# Patient Record
Sex: FEMALE | Race: WHITE | NOT HISPANIC OR LATINO | Employment: FULL TIME | URBAN - METROPOLITAN AREA
[De-identification: names, ages, dates, MRNs, and addresses within clinical notes are randomized per-mention and may not be internally consistent; named-entity substitution may affect disease eponyms.]

---

## 2023-08-07 ENCOUNTER — TELEPHONE (OUTPATIENT)
Dept: PRIMARY CARE | Facility: CLINIC | Age: 38
End: 2023-08-07
Payer: COMMERCIAL

## 2023-08-07 DIAGNOSIS — M25.511 ACUTE PAIN OF RIGHT SHOULDER: Primary | ICD-10-CM

## 2023-08-07 PROBLEM — K58.9 IBS (IRRITABLE BOWEL SYNDROME): Status: ACTIVE | Noted: 2023-08-07

## 2023-08-07 PROBLEM — R79.89 TSH ELEVATION: Status: ACTIVE | Noted: 2023-08-07

## 2023-08-07 NOTE — TELEPHONE ENCOUNTER
Went to PT at [facility] in [place] for shoulder, hip, knee finished in June.  Left shoulder is acting up again called [facility] to make appt was told she needs a new referral since original was signed off in June

## 2023-08-08 ENCOUNTER — OFFICE VISIT (OUTPATIENT)
Dept: PRIMARY CARE | Facility: CLINIC | Age: 38
End: 2023-08-08
Payer: COMMERCIAL

## 2023-08-08 VITALS
DIASTOLIC BLOOD PRESSURE: 62 MMHG | HEART RATE: 70 BPM | HEIGHT: 65 IN | WEIGHT: 124.6 LBS | SYSTOLIC BLOOD PRESSURE: 100 MMHG | BODY MASS INDEX: 20.76 KG/M2 | TEMPERATURE: 97.5 F | RESPIRATION RATE: 16 BRPM

## 2023-08-08 DIAGNOSIS — G89.29 CHRONIC LEFT SHOULDER PAIN: Primary | ICD-10-CM

## 2023-08-08 DIAGNOSIS — M25.512 CHRONIC LEFT SHOULDER PAIN: Primary | ICD-10-CM

## 2023-08-08 PROBLEM — N94.819 VULVODYNIA: Status: ACTIVE | Noted: 2023-08-08

## 2023-08-08 PROCEDURE — 1036F TOBACCO NON-USER: CPT | Performed by: FAMILY MEDICINE

## 2023-08-08 PROCEDURE — 99212 OFFICE O/P EST SF 10 MIN: CPT | Performed by: FAMILY MEDICINE

## 2023-08-08 RX ORDER — ESTRADIOL 0.1 MG/G
CREAM VAGINAL
COMMUNITY
Start: 2023-06-19

## 2023-08-08 RX ORDER — FLUTICASONE PROPIONATE 50 MCG
1 SPRAY, SUSPENSION (ML) NASAL
COMMUNITY

## 2023-08-08 RX ORDER — BECLOMETHASONE DIPROPIONATE HFA 80 UG/1
2 AEROSOL, METERED RESPIRATORY (INHALATION) 2 TIMES DAILY
COMMUNITY

## 2023-08-08 NOTE — PROGRESS NOTES
"Subjective   Patient ID: Shruti Graham is a 37 y.o. female who presents for Shoulder Pain (Left shoulder pain for a couple months. Patient states the pain is a 3-4 during workouts. ).    Hurts anytime lifts shoulder, injured it in the past,   went to PT in the past  Doing heavy lifting   Injured 10 years ago  right handed  Hurts to raise up left arm  does lots weight lifting          Review of Systems    Objective   /62   Pulse 70   Temp 36.4 °C (97.5 °F)   Resp 16   Ht 1.651 m (5' 5\")   Wt 56.5 kg (124 lb 9.6 oz)   BMI 20.73 kg/m²     Physical Exam  Musculoskeletal:      Left shoulder: Tenderness present. Decreased range of motion.         Assessment/Plan   Problem List Items Addressed This Visit    None  Visit Diagnoses       Chronic left shoulder pain    -  Primary    Relevant Orders    Referral to Physical Therapy               "

## 2023-08-15 LAB
ALANINE AMINOTRANSFERASE (SGPT) (U/L) IN SER/PLAS: 21 U/L (ref 7–45)
ALBUMIN (G/DL) IN SER/PLAS: 4.4 G/DL (ref 3.4–5)
ALKALINE PHOSPHATASE (U/L) IN SER/PLAS: 67 U/L (ref 33–110)
ANION GAP IN SER/PLAS: 10 MMOL/L (ref 10–20)
APPEARANCE, URINE: CLEAR
ASPARTATE AMINOTRANSFERASE (SGOT) (U/L) IN SER/PLAS: 18 U/L (ref 9–39)
BILIRUBIN TOTAL (MG/DL) IN SER/PLAS: 0.6 MG/DL (ref 0–1.2)
BILIRUBIN, URINE: NEGATIVE
BLOOD, URINE: NEGATIVE
CALCIUM (MG/DL) IN SER/PLAS: 9.3 MG/DL (ref 8.6–10.6)
CARBON DIOXIDE, TOTAL (MMOL/L) IN SER/PLAS: 26 MMOL/L (ref 21–32)
CHLORIDE (MMOL/L) IN SER/PLAS: 106 MMOL/L (ref 98–107)
CHOLESTEROL (MG/DL) IN SER/PLAS: 224 MG/DL (ref 0–199)
CHOLESTEROL IN HDL (MG/DL) IN SER/PLAS: 79.8 MG/DL
CHOLESTEROL/HDL RATIO: 2.8
COLOR, URINE: YELLOW
CREATININE (MG/DL) IN SER/PLAS: 1.15 MG/DL (ref 0.5–1.05)
ERYTHROCYTE DISTRIBUTION WIDTH (RATIO) BY AUTOMATED COUNT: 13.5 % (ref 11.5–14.5)
ERYTHROCYTE MEAN CORPUSCULAR HEMOGLOBIN CONCENTRATION (G/DL) BY AUTOMATED: 31.9 G/DL (ref 32–36)
ERYTHROCYTE MEAN CORPUSCULAR VOLUME (FL) BY AUTOMATED COUNT: 91 FL (ref 80–100)
ERYTHROCYTES (10*6/UL) IN BLOOD BY AUTOMATED COUNT: 4.67 X10E12/L (ref 4–5.2)
GFR FEMALE: 63 ML/MIN/1.73M2
GLUCOSE (MG/DL) IN SER/PLAS: 86 MG/DL (ref 74–99)
GLUCOSE, URINE: NEGATIVE MG/DL
HEMATOCRIT (%) IN BLOOD BY AUTOMATED COUNT: 42.3 % (ref 36–46)
HEMOGLOBIN (G/DL) IN BLOOD: 13.5 G/DL (ref 12–16)
KETONES, URINE: NEGATIVE MG/DL
LDL: 132 MG/DL (ref 0–99)
LEUKOCYTE ESTERASE, URINE: NEGATIVE
LEUKOCYTES (10*3/UL) IN BLOOD BY AUTOMATED COUNT: 6.5 X10E9/L (ref 4.4–11.3)
NITRITE, URINE: NEGATIVE
NRBC (PER 100 WBCS) BY AUTOMATED COUNT: 0 /100 WBC (ref 0–0)
PH, URINE: 5 (ref 5–8)
PLATELETS (10*3/UL) IN BLOOD AUTOMATED COUNT: 332 X10E9/L (ref 150–450)
POTASSIUM (MMOL/L) IN SER/PLAS: 4.4 MMOL/L (ref 3.5–5.3)
PROTEIN TOTAL: 6.7 G/DL (ref 6.4–8.2)
PROTEIN, URINE: NEGATIVE MG/DL
SODIUM (MMOL/L) IN SER/PLAS: 138 MMOL/L (ref 136–145)
SPECIFIC GRAVITY, URINE: 1.01 (ref 1–1.03)
TRIGLYCERIDE (MG/DL) IN SER/PLAS: 62 MG/DL (ref 0–149)
UREA NITROGEN (MG/DL) IN SER/PLAS: 17 MG/DL (ref 6–23)
UROBILINOGEN, URINE: <2 MG/DL (ref 0–1.9)
VLDL: 12 MG/DL (ref 0–40)

## 2023-09-01 LAB
ALANINE AMINOTRANSFERASE (SGPT) (U/L) IN SER/PLAS: NORMAL
ALBUMIN (G/DL) IN SER/PLAS: NORMAL
ALKALINE PHOSPHATASE (U/L) IN SER/PLAS: NORMAL
ANION GAP IN SER/PLAS: NORMAL
APPEARANCE, URINE: NORMAL
ASCORBIC ACID: NORMAL MG/DL
ASPARTATE AMINOTRANSFERASE (SGOT) (U/L) IN SER/PLAS: NORMAL
BILIRUBIN TOTAL (MG/DL) IN SER/PLAS: NORMAL
BILIRUBIN, URINE: NORMAL
BLOOD, URINE: NORMAL
CALCIUM (MG/DL) IN SER/PLAS: NORMAL
CARBON DIOXIDE, TOTAL (MMOL/L) IN SER/PLAS: NORMAL
CHLORIDE (MMOL/L) IN SER/PLAS: NORMAL
CHOLESTEROL (MG/DL) IN SER/PLAS: NORMAL
CHOLESTEROL IN HDL (MG/DL) IN SER/PLAS: NORMAL
CHOLESTEROL/HDL RATIO: NORMAL
COLOR, URINE: NORMAL
CREATININE (MG/DL) IN SER/PLAS: NORMAL
ERYTHROCYTE DISTRIBUTION WIDTH (RATIO) BY AUTOMATED COUNT: NORMAL
ERYTHROCYTE MEAN CORPUSCULAR HEMOGLOBIN CONCENTRATION (G/DL) BY AUTOMATED: NORMAL
ERYTHROCYTE MEAN CORPUSCULAR VOLUME (FL) BY AUTOMATED COUNT: NORMAL
ERYTHROCYTES (10*6/UL) IN BLOOD BY AUTOMATED COUNT: NORMAL
GFR FEMALE: NORMAL
GFR MALE: NORMAL
GLUCOSE (MG/DL) IN SER/PLAS: NORMAL
GLUCOSE, URINE: NORMAL
HEMATOCRIT (%) IN BLOOD BY AUTOMATED COUNT: NORMAL
HEMOGLOBIN (G/DL) IN BLOOD: NORMAL
KETONES, URINE: NORMAL
LDL: NORMAL
LEUKOCYTE ESTERASE, URINE: NORMAL
LEUKOCYTES (10*3/UL) IN BLOOD BY AUTOMATED COUNT: NORMAL
NITRITE, URINE: NORMAL
NON HDL CHOLESTEROL: NORMAL
NRBC (PER 100 WBCS) BY AUTOMATED COUNT: NORMAL
PH, URINE: NORMAL
PLATELETS (10*3/UL) IN BLOOD AUTOMATED COUNT: NORMAL
POTASSIUM (MMOL/L) IN SER/PLAS: NORMAL
PROTEIN TOTAL: NORMAL
PROTEIN, URINE: NORMAL
SODIUM (MMOL/L) IN SER/PLAS: NORMAL
SPECIFIC GRAVITY, URINE: NORMAL
TRIGLYCERIDE (MG/DL) IN SER/PLAS: NORMAL
UREA NITROGEN (MG/DL) IN SER/PLAS: NORMAL
UROBILINOGEN, URINE: NORMAL
VLDL: NORMAL

## 2023-10-06 ENCOUNTER — APPOINTMENT (OUTPATIENT)
Dept: PHYSICAL THERAPY | Facility: CLINIC | Age: 38
End: 2023-10-06
Payer: COMMERCIAL

## 2023-10-10 PROBLEM — M25.511 RIGHT SHOULDER PAIN: Status: RESOLVED | Noted: 2023-08-07 | Resolved: 2023-10-10

## 2023-10-10 PROBLEM — J45.909 ASTHMA (HHS-HCC): Status: ACTIVE | Noted: 2023-10-10

## 2023-10-10 PROBLEM — M76.51 PATELLAR TENDINITIS OF RIGHT KNEE: Status: ACTIVE | Noted: 2023-10-10

## 2023-10-10 PROBLEM — M19.90 OSTEOARTHRITIS: Status: ACTIVE | Noted: 2023-10-10

## 2023-10-10 PROBLEM — N39.3 STRESS INCONTINENCE, FEMALE: Status: ACTIVE | Noted: 2023-10-10

## 2023-10-10 PROBLEM — S76.312A STRAIN OF LEFT HAMSTRING: Status: ACTIVE | Noted: 2023-10-10

## 2023-10-10 RX ORDER — CLASCOTERONE 1 G/100G
CREAM TOPICAL
COMMUNITY
Start: 2023-09-18 | End: 2024-04-23 | Stop reason: ALTCHOICE

## 2023-10-10 RX ORDER — CLOTRIMAZOLE AND BETAMETHASONE DIPROPIONATE 10; .64 MG/G; MG/G
CREAM TOPICAL
COMMUNITY
End: 2024-04-23 | Stop reason: ALTCHOICE

## 2023-10-10 RX ORDER — FLUCONAZOLE 150 MG/1
TABLET ORAL
COMMUNITY

## 2023-10-10 RX ORDER — 1.1% SODIUM FLUORIDE PRESCRIPTION DENTAL CREAM 5 MG/G
CREAM DENTAL
COMMUNITY
Start: 2023-06-24

## 2023-10-11 ENCOUNTER — TREATMENT (OUTPATIENT)
Dept: PHYSICAL THERAPY | Facility: CLINIC | Age: 38
End: 2023-10-11
Payer: COMMERCIAL

## 2023-10-11 DIAGNOSIS — M25.511 ACUTE PAIN OF RIGHT SHOULDER: ICD-10-CM

## 2023-10-11 DIAGNOSIS — G89.29 CHRONIC LEFT SHOULDER PAIN: Primary | ICD-10-CM

## 2023-10-11 DIAGNOSIS — M25.512 CHRONIC LEFT SHOULDER PAIN: Primary | ICD-10-CM

## 2023-10-11 PROCEDURE — 97110 THERAPEUTIC EXERCISES: CPT | Mod: GP | Performed by: SPECIALIST/TECHNOLOGIST

## 2023-10-11 PROCEDURE — 97140 MANUAL THERAPY 1/> REGIONS: CPT | Mod: GP | Performed by: SPECIALIST/TECHNOLOGIST

## 2023-10-11 ASSESSMENT — PAIN SCALES - GENERAL: PAINLEVEL_OUTOF10: 5 - MODERATE PAIN

## 2023-10-11 ASSESSMENT — PAIN - FUNCTIONAL ASSESSMENT: PAIN_FUNCTIONAL_ASSESSMENT: 0-10

## 2023-10-11 NOTE — PROGRESS NOTES
"Physical Therapy Treatment    Patient Name: Shruti Graham  MRN: [id number]  Today's Date: 10/11/2023       Current Problem  1. Chronic left shoulder pain            Insurance   Payer: [facility]  Visit Number: 23  Approved Visits: Unlimited  Date Range: PCY    Precautions         Subjective   Patient reports feeling good upon arrival.  States left shoulder has been doing very well with the amount of volume that she has been doing.  States right clavicle is recently flared up.  P! W/ inhalation. States compliance with HEP    Pain  Pain Assessment: 0-10  Pain Score: 5 - Moderate pain  Pain Type: Acute pain  Pain Location:  (clavicle)      Objective   P! W/ inhalation  No signifcant TTP except coracoid  Pec Minor shortness   No 1st rib decreased mobility   L shld seems to be continuing to improve    Treatments:  There Ex:   UBE 3 min fwd/bwd     C Spine Retraction + Lift 3\"x15   Banded C Spine Retraction 10x\"    Pec Minor Stretch 2x60\"  Prone Y 3x30\"         Manual:   STM L Posterior Shld, L scalene, rhomboids  R subclavius/pec minor/scale STM  R 1st Rib Mobs  global scap mobs.      Neuro Re-Ed:       Modalities:       Assessment:   Session focused on restoration of dynamic stability and decreasing tissue tone noted in the right upper extremity and neck musculature.  Patient responded well.  At this time the right shoulder pain that is located inferior to the clavicle seems to be more like an intercostal muscle noted due to increase in pain with inhalation as well as not being able to truly reproduce with muscle testing.  Did demonstrate decrease in length and pectoralis minor but at this time it does not seem to be a concordant sign.  Education was provided about this and proper Valsalva maneuver when completing compound list.  Education was provided that at decreased intensity lifts patient should not have to utilize Valsalva maneuver at such a high intensity.  Patient continues to need skilled physical therapy in order " restore functional range of motion strength and capacity post right and left shoulder pain    Plan:   Monitor R shld, continue w/ tendon loading of LUE cuff musculature    Goals:         Nabil Parker, PT

## 2023-10-18 ENCOUNTER — TREATMENT (OUTPATIENT)
Dept: PHYSICAL THERAPY | Facility: CLINIC | Age: 38
End: 2023-10-18
Payer: COMMERCIAL

## 2023-10-18 DIAGNOSIS — M25.512 CHRONIC LEFT SHOULDER PAIN: Primary | ICD-10-CM

## 2023-10-18 DIAGNOSIS — G89.29 CHRONIC LEFT SHOULDER PAIN: Primary | ICD-10-CM

## 2023-10-18 PROCEDURE — 97140 MANUAL THERAPY 1/> REGIONS: CPT | Mod: GP | Performed by: SPECIALIST/TECHNOLOGIST

## 2023-10-18 PROCEDURE — 97110 THERAPEUTIC EXERCISES: CPT | Mod: GP | Performed by: SPECIALIST/TECHNOLOGIST

## 2023-10-18 ASSESSMENT — PAIN SCALES - GENERAL: PAINLEVEL_OUTOF10: 1

## 2023-10-18 ASSESSMENT — PAIN - FUNCTIONAL ASSESSMENT: PAIN_FUNCTIONAL_ASSESSMENT: 0-10

## 2023-10-18 NOTE — PROGRESS NOTES
"Physical Therapy Treatment    Patient Name: Shruti Graham  MRN: [id number]  Today's Date: 10/18/2023  Time Calculation  Start Time: 0230  Stop Time: 0330  Time Calculation (min): 60 min    Current Problem  1. Chronic left shoulder pain              Insurance   Payer: [facility]  Visit Number: 24  Approved Visits: Unlimited  Date Range: PCY    Precautions         Subjective   Patient reports feeling good upon arrival.  Left shoulder tolerated the volume without any significant increase in pain.  Was achy but nothing that she was worried about.  Right clavicle continues to feel the same but only is when she does a Valsalva maneuver.  Reports she did recently flareup her hamstring again    Pain  Pain Assessment: 0-10  Pain Score: 1 shoulder  L HS 5/10       Objective   Decreased tone noted on medial border of scapula and scapular stabilizers.     Treatments:  There Ex:   UBE 3 min fwd/bwd     C Spine Retraction + Lift 3\"x15    Pec Minor Stretch 2x60\"    Seated 90/90 ER Ecc 3x8 6#  Side Plank + shld ER 3x12 4#    90/90 HS ISO 3x30\"  MHFAKE 2x20 ea      Manual:   STM L Posterior Shld, L scalene, rhomboids  global scap mobs.    TrP Prox HS      Neuro Re-Ed:       Modalities:       Assessment:   Session focused on restoration of dynamic stability with progression to eccentric's as well as reassessment of left proximal hamstring.  Patient presents with potential left hamstring proximal tendinopathy and responded well to gentle range of motion with focus on max hip flexion as well as did tolerate increase to eccentric's for left shoulder.  Education was provided about continuing eccentric's as long as they do not significantly increased irritability.  Patient verbalizes understanding and agreement.  Verbalizes understanding with tendon loading protocol.  Patient would like to try integrative dry needling at proximal hamstring neck session.  Education was provided about wearing shorts. Patient continues to need skilled physical " therapy in order restore functional range of motion strength and capacity post right and left shoulder pain    Plan:   Monitor R shld, continue w/ tendon loading of LUE cuff musculature    Goals:         Nabil Parker, PT

## 2023-10-23 ENCOUNTER — TREATMENT (OUTPATIENT)
Dept: PHYSICAL THERAPY | Facility: CLINIC | Age: 38
End: 2023-10-23
Payer: COMMERCIAL

## 2023-10-23 DIAGNOSIS — M25.512 CHRONIC LEFT SHOULDER PAIN: ICD-10-CM

## 2023-10-23 DIAGNOSIS — G89.29 CHRONIC LEFT SHOULDER PAIN: ICD-10-CM

## 2023-10-23 DIAGNOSIS — S76.312S STRAIN OF LEFT HAMSTRING, SEQUELA: Primary | ICD-10-CM

## 2023-10-23 PROCEDURE — 97140 MANUAL THERAPY 1/> REGIONS: CPT | Mod: GP | Performed by: SPECIALIST/TECHNOLOGIST

## 2023-10-23 PROCEDURE — 97110 THERAPEUTIC EXERCISES: CPT | Mod: GP | Performed by: SPECIALIST/TECHNOLOGIST

## 2023-10-23 ASSESSMENT — PAIN - FUNCTIONAL ASSESSMENT: PAIN_FUNCTIONAL_ASSESSMENT: 0-10

## 2023-10-23 ASSESSMENT — PAIN SCALES - GENERAL: PAINLEVEL_OUTOF10: 2

## 2023-10-23 NOTE — PROGRESS NOTES
"Physical Therapy Treatment    Patient Name: Shruti Graham  MRN: [id number]  Today's Date: 10/23/2023  Time Calculation  Start Time: 0227  Stop Time: 0327  Time Calculation (min): 60 min    Current Problem  1. Strain of left hamstring, sequela        2. Chronic left shoulder pain                Insurance   Payer: [facility]  Visit Number: 25  Approved Visits: Unlimited  Date Range: PCY    Precautions         Subjective   Patient reports feeling good upon arrival.  Had some soreness in the shoulder after last session which is to be expected with eccentric exercise.  States that completing hamstring exercises has been very beneficial specifically prior to lifting.  States compliance with HEP.  States hike over the weekend went well with no significant limitations.      Pain  Pain Assessment: 0-10  Pain Score: 2  Pain Location:  (Shoulder)  L HS 5/10       Objective   Decreased tone noted on medial border of scapula and scapular stabilizers.   No significant irritation noted with integrative dry needling.  Normalize response.  No significant increase in pain from needles.  Multiple twitch responses noted      Treatments:  There Ex:   Bike x6 min    Side Plank + shld ER 2x12 4# 5-0-0  Razor Curl ISO 2x3x10\"    90/90 HS ISO 5x45\"  MHFAKE 2x20 ea      Manual:   STM L Posterior Shld, rhomboids    TrP Prox HS   IDN L HS      Neuro Re-Ed:       Modalities:       Assessment:   Session focused on restoration of dynamic stability with progression to eccentric's as well as continued attempt to decrease irritability noted at proximal hamstring.  Patient responded well to integrative dry needling with no significant irritation or or skin reactions.  Education was provided about reactions to dry needling including potential soreness and potential bruising and verbalizes understanding and agreement.  Tolerated addition of higher intensity Nordic's with max hip flexion to target the proximal hamstring.  Demonstrated weakness with this but " adequately challenged.  Education was provided about heavy slow resistance specifically for tendinopathy of left rotator cuff and did well with this.  No significant increase in irritation noted at left cuff. Patient continues to need skilled physical therapy in order restore functional range of motion strength and capacity post left shoulder and hamstring pain  Plan:   Monitor R shld, continue w/ tendon loading of LUE cuff musculature    Goals:         Nabil Parker, PT

## 2023-10-31 ENCOUNTER — TREATMENT (OUTPATIENT)
Dept: PHYSICAL THERAPY | Facility: CLINIC | Age: 38
End: 2023-10-31
Payer: COMMERCIAL

## 2023-10-31 DIAGNOSIS — G89.29 CHRONIC LEFT SHOULDER PAIN: ICD-10-CM

## 2023-10-31 DIAGNOSIS — S76.312S STRAIN OF LEFT HAMSTRING, SEQUELA: Primary | ICD-10-CM

## 2023-10-31 DIAGNOSIS — M25.512 CHRONIC LEFT SHOULDER PAIN: ICD-10-CM

## 2023-10-31 PROCEDURE — 97110 THERAPEUTIC EXERCISES: CPT | Mod: GP | Performed by: SPECIALIST/TECHNOLOGIST

## 2023-10-31 PROCEDURE — 97140 MANUAL THERAPY 1/> REGIONS: CPT | Mod: GP | Performed by: SPECIALIST/TECHNOLOGIST

## 2023-10-31 ASSESSMENT — PAIN - FUNCTIONAL ASSESSMENT: PAIN_FUNCTIONAL_ASSESSMENT: 0-10

## 2023-10-31 ASSESSMENT — PAIN SCALES - GENERAL: PAINLEVEL_OUTOF10: 3

## 2023-10-31 NOTE — PROGRESS NOTES
"Physical Therapy Treatment    Patient Name: Shruti Graham  MRN: [id number]  Today's Date: 10/31/2023  Time Calculation  Start Time: 0827  Stop Time: 0915  Time Calculation (min): 48 min    Current Problem  1. Strain of left hamstring, sequela        2. Chronic left shoulder pain                  Insurance   Payer: [facility]  Visit Number: 26  Approved Visits: Unlimited  Date Range: PCY    Precautions         Subjective   Patient reports feeling good upon arrival.  States compliance with HEP.  Pain is significantly decreased after needling and had several days of complete pain-free.  Would like to needle again today.  Had a personal record deadlift yesterday and demonstrated no significant increase in pain and global muscle soreness today    Pain  Pain Assessment: 0-10  Pain Score: 3      Objective   No negative responses to IDN      Treatments:  There Ex:   Elliptical x8 min    90/90 HS ISO 5x45\"  MHFAKE 2x20 ea    RFD SB HS Curl 2x8   90/90 Ecc HS Curl Supine w/ Ankle Strap 2x8     Manual:     TrP Prox HS   IDN L HS      Neuro Re-Ed:       Modalities:       Assessment:   Session focused on restoration of dynamic stability with progression to eccentric's as well as continued attempt to decrease irritability noted at proximal hamstring.  Patient continues to respond well.  Tolerated addition of eccentric's for proximal hamstring tendinopathy without significant increase in pain.  Education was provided about adding these in outside of therapy and patient verbalizes understanding and agreement.  Decreased intensity was utilized within the session due to the increase intensity outside of session with training regimen at this time. Patient continues to need skilled physical therapy in order restore functional range of motion strength and capacity post left shoulder and hamstring pain  Plan:   Monitor R shld, continue w/ tendon loading of LUE cuff musculature    Goals:         Nabil Parker, PT   "

## 2023-11-06 ENCOUNTER — TREATMENT (OUTPATIENT)
Dept: PHYSICAL THERAPY | Facility: CLINIC | Age: 38
End: 2023-11-06
Payer: COMMERCIAL

## 2023-11-06 DIAGNOSIS — G89.29 CHRONIC LEFT SHOULDER PAIN: Primary | ICD-10-CM

## 2023-11-06 DIAGNOSIS — S76.312S STRAIN OF LEFT HAMSTRING, SEQUELA: ICD-10-CM

## 2023-11-06 DIAGNOSIS — M25.512 CHRONIC LEFT SHOULDER PAIN: Primary | ICD-10-CM

## 2023-11-06 PROCEDURE — 97110 THERAPEUTIC EXERCISES: CPT | Mod: GP | Performed by: SPECIALIST/TECHNOLOGIST

## 2023-11-06 PROCEDURE — 97140 MANUAL THERAPY 1/> REGIONS: CPT | Mod: GP | Performed by: SPECIALIST/TECHNOLOGIST

## 2023-11-06 ASSESSMENT — PAIN SCALES - GENERAL: PAINLEVEL_OUTOF10: 1

## 2023-11-06 ASSESSMENT — PAIN - FUNCTIONAL ASSESSMENT: PAIN_FUNCTIONAL_ASSESSMENT: 0-10

## 2023-11-06 NOTE — PROGRESS NOTES
"Physical Therapy Treatment    Patient Name: Shruti Graham  MRN: [id number]  Today's Date: 11/6/2023  Time Calculation  Start Time: 0230  Stop Time: 0325  Time Calculation (min): 55 min    Current Problem  1. Chronic left shoulder pain        2. Strain of left hamstring, sequela                    Insurance   Payer: [facility]  Visit Number: 27  Approved Visits: Unlimited  Date Range: PCY    Precautions         Subjective   Patient reports feeling good upon arrival.  No significant pain.  States that she feels like the needling continues to help.  States compliance with HEP.  States that she has not had significant pain after dead lifting since starting needling.    Pain  Pain Assessment: 0-10  Pain Score: 1      Objective   No negative responses to IDN      Treatments:  There Ex:   Elliptical x8 min    90/90 HS ISO 5x45\"  MHFAKE 2x20 ea    DL --> SL Prone HS Curl 2x8 30#  90/90 Ecc HS Curl Supine w/ Ankle Strap 3x6 45#    Manual:     TrP Prox HS   IDN L HS      Neuro Re-Ed:       Modalities:       Assessment:   Session focused on restoration of dynamic stability with progression to eccentric's as well as continued attempt to decrease irritability noted at proximal hamstring.  Patient continues to respond well.  Continues to tolerate addition of eccentric's at higher resistance compared to previous sessions.  Education is provided that patient needs to continue to program eccentric's within the program in order to progress tendon loading protocol.  Patient did ask questions about shockwave therapy but will not be initiated until after patient's weightlifting meet in a week and a half.  Decreased intensity was utilized within the session due to the increase intensity outside of session with training regimen at this time. Patient continues to need skilled physical therapy in order restore functional range of motion strength and capacity post left shoulder and hamstring pain  Plan:   Monitor R shld, continue w/ tendon loading " of LUE cuff musculature    Goals:         Nabil Parker, PT

## 2023-11-13 ENCOUNTER — APPOINTMENT (OUTPATIENT)
Dept: PHYSICAL THERAPY | Facility: CLINIC | Age: 38
End: 2023-11-13
Payer: COMMERCIAL

## 2023-11-15 ENCOUNTER — TREATMENT (OUTPATIENT)
Dept: PHYSICAL THERAPY | Facility: CLINIC | Age: 38
End: 2023-11-15
Payer: COMMERCIAL

## 2023-11-15 DIAGNOSIS — S76.312S STRAIN OF LEFT HAMSTRING, SEQUELA: Primary | ICD-10-CM

## 2023-11-15 PROCEDURE — 97110 THERAPEUTIC EXERCISES: CPT | Mod: GP | Performed by: SPECIALIST/TECHNOLOGIST

## 2023-11-15 PROCEDURE — 97016 VASOPNEUMATIC DEVICE THERAPY: CPT | Mod: GP | Performed by: SPECIALIST/TECHNOLOGIST

## 2023-11-15 PROCEDURE — 97140 MANUAL THERAPY 1/> REGIONS: CPT | Mod: GP | Performed by: SPECIALIST/TECHNOLOGIST

## 2023-11-15 NOTE — PROGRESS NOTES
"Physical Therapy Treatment    Patient Name: Shruti Graham  MRN: [id number]  Today's Date: 11/15/2023  Time Calculation  Start Time: 0230  Stop Time: 0330  Time Calculation (min): 60 min    Current Problem  1. Strain of left hamstring, sequela                      Insurance   Payer: [facility]  Visit Number: 28  Approved Visits: Unlimited  Date Range: PCY    Precautions         Subjective   Patient reports feeling good upon arrival.  No significant pain.  States some soreness in prox HS but no significant p!. Deadlifting has been pain free.    Pain         Objective   No negative responses to IDN  Normalized ROM w/ MHFAKE    Treatments:  There Ex:   Elliptical x8 min    90/90 HS ISO 3x45\"  MHFAKE 3x20 ea      Manual:     TrP Prox HS   IDN L HS      Neuro Re-Ed:       Modalities:   GameReady L Hip 15 min med pressure    Assessment:   Session focused on restoration of tissue tolerance and tissue tone with use of dry needling and trigger point release.  Patient responded well.  No significant increase in intensity was had due to patient competing this upcoming weekend.  Utilization of Game ready to decrease amount of soreness that patient will have post needling.  Patient responded well to all interventions.  Heavy slow resistance will be continued after patient's upcoming power lifting meet. Patient continues to need skilled physical therapy in order restore functional range of motion strength and capacity post left shoulder and hamstring pain      Plan:   Progress to HSR w/ LLE for prox HS tendon    Goals:         Nabil Parker, PT   "

## 2023-11-20 ENCOUNTER — TREATMENT (OUTPATIENT)
Dept: PHYSICAL THERAPY | Facility: CLINIC | Age: 38
End: 2023-11-20
Payer: COMMERCIAL

## 2023-11-20 DIAGNOSIS — S76.312S STRAIN OF LEFT HAMSTRING, SEQUELA: ICD-10-CM

## 2023-11-20 DIAGNOSIS — M25.512 CHRONIC LEFT SHOULDER PAIN: Primary | ICD-10-CM

## 2023-11-20 DIAGNOSIS — G89.29 CHRONIC LEFT SHOULDER PAIN: Primary | ICD-10-CM

## 2023-11-20 PROCEDURE — 97140 MANUAL THERAPY 1/> REGIONS: CPT | Mod: GP | Performed by: SPECIALIST/TECHNOLOGIST

## 2023-11-20 PROCEDURE — 97110 THERAPEUTIC EXERCISES: CPT | Mod: GP | Performed by: SPECIALIST/TECHNOLOGIST

## 2023-11-20 PROCEDURE — 97016 VASOPNEUMATIC DEVICE THERAPY: CPT | Mod: GP | Performed by: SPECIALIST/TECHNOLOGIST

## 2023-11-20 NOTE — PROGRESS NOTES
"Physical Therapy Treatment    Patient Name: Shruti Graham  MRN: [id number]  Today's Date: 11/20/2023  Time Calculation  Start Time: 0130  Stop Time: 0245  Time Calculation (min): 75 min    Current Problem  1. Chronic left shoulder pain        2. Strain of left hamstring, sequela                        Insurance   Payer: [facility]  Visit Number: 29  Approved Visits: Unlimited  Date Range: PCY    Precautions         Subjective   Patient reports feeling good upon arrival. Went 9/9 in weightlifting meet with no pain during. Some achiness in prox HS as well as L shld posteriorly.     Pain   1/10      Objective   No negative responses to IDN  Normalized ROM w/ MHFAKE  Increased TTP near prox HS    Treatments:  There Ex:   Elliptical x8 min    90/90 HS ISO 3x45\"  MHFAKE 3x20 ea    90/90 Ecc HS w/ Cable x10    HS Walkout x10    Ashvin Curl 4x6  Prone ER/IR ISO in 90/90 4x5x10\"      Manual:     TrP Prox HS   IDN L HS   TrP L shld     Neuro Re-Ed:       Modalities:   GameReady L Hip 15 min med pressure    Assessment:   Session focused on restoration of tissue tolerance and tissue tone with use of dry needling and trigger point release as well as progression to more isotonic loading. No increase in p! Throughout duration of session. Adequately challenged by increase in intensity of exercises. Overall doing well and is ready to progress to heavy slow resistance after deload. Patient continues to need skilled physical therapy in order restore functional range of motion strength and capacity post left shoulder and hamstring pain      Plan:   Progress to HSR w/ LLE for prox HS tendon    Goals:         Nabil Parker, PT   "

## 2023-12-01 ENCOUNTER — TREATMENT (OUTPATIENT)
Dept: PHYSICAL THERAPY | Facility: CLINIC | Age: 38
End: 2023-12-01
Payer: COMMERCIAL

## 2023-12-01 DIAGNOSIS — S76.312S STRAIN OF LEFT HAMSTRING, SEQUELA: ICD-10-CM

## 2023-12-01 DIAGNOSIS — M25.512 CHRONIC LEFT SHOULDER PAIN: Primary | ICD-10-CM

## 2023-12-01 DIAGNOSIS — G89.29 CHRONIC LEFT SHOULDER PAIN: Primary | ICD-10-CM

## 2023-12-01 PROCEDURE — 97140 MANUAL THERAPY 1/> REGIONS: CPT | Mod: GP | Performed by: SPECIALIST/TECHNOLOGIST

## 2023-12-01 PROCEDURE — 97110 THERAPEUTIC EXERCISES: CPT | Mod: GP | Performed by: SPECIALIST/TECHNOLOGIST

## 2023-12-01 NOTE — PROGRESS NOTES
"Physical Therapy Treatment    Patient Name: Shruti Graham  MRN: [id number]  Today's Date: 12/1/2023  Time Calculation  Start Time: 1030  Stop Time: 1130  Time Calculation (min): 60 min    Current Problem  1. Chronic left shoulder pain        2. Strain of left hamstring, sequela                          Insurance   Payer: [facility]  Visit Number: 30  Approved Visits: Unlimited  Date Range: PCY    Precautions         Subjective   Patient reports feeling okay upon arrival. L Shld is angry from shoveling snow. Pt does report HS is good but achy. Felt during conventional DL and rows.     Pain   1/10      Objective   Increased tone in medial aspect of prox HS. No significant weakness w/ resistance testing of shld ER. P! At end range shld er in 90/90 posterior aspect        Treatments:  There Ex:   Elliptical x8 min    90/90 HS ISO x45\"  MHFAKE x20 ea    Conventional DL Setup ISO x30\"      Ashvin Curl 3x6  SL Landmine RDL 3x5 6-0-0      Manual:   TrP Prox HS   IDN L HS   TrP L shld     Neuro Re-Ed:       Modalities:       Assessment:   Session focused on restoration of tissue tolerance and tissue tone with use of dry needling and trigger point release as well as progression to more isotonic loading. No increase in p! Throughout duration of session. Education is provided about progressing to slow eccentrics and proper intensity with these and that keeping pain below 4/10 to progress tendon loading protocol. Pt verbalizes understanding and agreement. Patient continues to need skilled physical therapy in order restore functional range of motion strength and capacity post left shoulder and hamstring pain      Plan:   Progress to HSR w/ LLE for prox HS tendon    Goals:         Nabil Parker, PT   "

## 2023-12-05 ENCOUNTER — TELEPHONE (OUTPATIENT)
Dept: UROLOGY | Facility: CLINIC | Age: 38
End: 2023-12-05
Payer: COMMERCIAL

## 2023-12-05 NOTE — TELEPHONE ENCOUNTER
Pt left message stating she needs her compound cream for Buderer, estradiol/testo to be filled out to take daily x 14 days and then 3 times a week thereafter. Spoke with Iram and straightened out the rx as they took those directions from me verbally over the phone.

## 2023-12-06 ENCOUNTER — TREATMENT (OUTPATIENT)
Dept: PHYSICAL THERAPY | Facility: CLINIC | Age: 38
End: 2023-12-06
Payer: COMMERCIAL

## 2023-12-06 DIAGNOSIS — S76.312S STRAIN OF LEFT HAMSTRING, SEQUELA: Primary | ICD-10-CM

## 2023-12-06 DIAGNOSIS — G89.29 CHRONIC LEFT SHOULDER PAIN: ICD-10-CM

## 2023-12-06 DIAGNOSIS — M25.512 CHRONIC LEFT SHOULDER PAIN: ICD-10-CM

## 2023-12-06 PROCEDURE — 97016 VASOPNEUMATIC DEVICE THERAPY: CPT | Mod: GP | Performed by: SPECIALIST/TECHNOLOGIST

## 2023-12-06 PROCEDURE — 97110 THERAPEUTIC EXERCISES: CPT | Mod: GP | Performed by: SPECIALIST/TECHNOLOGIST

## 2023-12-06 PROCEDURE — 97140 MANUAL THERAPY 1/> REGIONS: CPT | Mod: GP | Performed by: SPECIALIST/TECHNOLOGIST

## 2023-12-06 NOTE — PROGRESS NOTES
"Physical Therapy Treatment    Patient Name: Shruti Graham  MRN: [id number]  Today's Date: 12/6/2023  Time Calculation  Start Time: 1135  Stop Time: 1245  Time Calculation (min): 70 min    Current Problem  1. Strain of left hamstring, sequela        2. Chronic left shoulder pain                            Insurance   Payer: [facility]  Visit Number: 31  Approved Visits: Unlimited  Date Range: PCY    Precautions         Subjective   States feels good upon arrival. Overall a bit achy when sitting for long periods    Pain   1/10      Objective   Significant increase in muscle twitch at adductor/HS intersection        Treatments:  There Ex:   Bike x5 min    90/90 HS ISO x45\"  MHFAKE x20 ea    DL Ecc SB HS Curls 4x10  Nordic ISO clusters w/ HF 4x2x15\"    Manual:   TrP Prox HS   IDN L HS     Neuro Re-Ed:       Modalities:   GameReady L Hip x15 min    Assessment:   Session focused on restoration of tissue tolerance and tissue tone with use of dry needling and trigger point release as well as progression to more isotonic loading. No increase in p! Throughout duration of session. Demonstrated increased muscle twitch distally to origin, which is new for patient. TTP about 2 fingers inferior to prox origin but improved through session. Felt significant relief by end of session. Pt verbalizes understanding and agreement. Patient continues to need skilled physical therapy in order restore functional range of motion strength and capacity post left shoulder and hamstring pain      Plan:   Progress to HSR w/ LLE for prox HS tendon    Goals:         Nabil Parker, PT   "

## 2023-12-18 ENCOUNTER — TREATMENT (OUTPATIENT)
Dept: PHYSICAL THERAPY | Facility: CLINIC | Age: 38
End: 2023-12-18
Payer: COMMERCIAL

## 2023-12-18 DIAGNOSIS — S76.312S STRAIN OF LEFT HAMSTRING, SEQUELA: ICD-10-CM

## 2023-12-18 DIAGNOSIS — G89.29 CHRONIC LEFT SHOULDER PAIN: Primary | ICD-10-CM

## 2023-12-18 DIAGNOSIS — M25.512 CHRONIC LEFT SHOULDER PAIN: Primary | ICD-10-CM

## 2023-12-18 PROCEDURE — 97140 MANUAL THERAPY 1/> REGIONS: CPT | Mod: GP | Performed by: SPECIALIST/TECHNOLOGIST

## 2023-12-18 PROCEDURE — 97110 THERAPEUTIC EXERCISES: CPT | Mod: GP | Performed by: SPECIALIST/TECHNOLOGIST

## 2023-12-18 NOTE — PROGRESS NOTES
"Physical Therapy Treatment    Patient Name: Shruti Graham  MRN: [id number]  Today's Date: 12/18/2023  Time Calculation  Start Time: 1130  Stop Time: 1230  Time Calculation (min): 60 min    Current Problem  1. Chronic left shoulder pain        2. Strain of left hamstring, sequela                              Insurance   Payer: [facility]  Visit Number: 32  Approved Visits: Unlimited  Date Range: PCY    Precautions         Subjective   States feels good upon arrival. Some soreness d/t increase in walking volume. Seems to be more hip in nature.     Pain   1/10      Objective   Significant increase in muscle twitch at adductor/HS intersection  TTP Glute Med      Treatments:  There Ex:   Ellipitcal x8 min    90/90 HS ISO 2x45\"  MHFAKE x20 ea    SL Ecc SB HS Curls 4x10  Short Lever Kimper    Manual:   TrP Prox HS   IDN L HS   STM L Glute    Neuro Re-Ed:       Modalities:       Assessment:   Session focused on restoration of tissue tolerance and tissue tone with use of dry needling and trigger point release as well as progression to more isotonic loading. No increase in pain throughout duration of session and continues to feel significant relief after soft tissue techniques were performed. Education about proper loading patterns and adding velocity/adductor strengthening and pt verbalizes understanding and agreement. Patient continues to need skilled physical therapy in order restore functional range of motion strength and capacity post left shoulder and hamstring pain      Plan:   Progress to HSR w/ LLE for prox HS tendon    Goals:         Nabil Parker, PT   "

## 2024-03-20 ENCOUNTER — APPOINTMENT (OUTPATIENT)
Dept: PRIMARY CARE | Facility: CLINIC | Age: 39
End: 2024-03-20
Payer: COMMERCIAL

## 2024-04-23 ENCOUNTER — OFFICE VISIT (OUTPATIENT)
Dept: PRIMARY CARE | Facility: CLINIC | Age: 39
End: 2024-04-23
Payer: COMMERCIAL

## 2024-04-23 VITALS
RESPIRATION RATE: 18 BRPM | HEART RATE: 74 BPM | HEIGHT: 65 IN | DIASTOLIC BLOOD PRESSURE: 74 MMHG | WEIGHT: 128 LBS | BODY MASS INDEX: 21.33 KG/M2 | OXYGEN SATURATION: 98 % | SYSTOLIC BLOOD PRESSURE: 116 MMHG

## 2024-04-23 DIAGNOSIS — J45.20 MILD INTERMITTENT ASTHMA, UNSPECIFIED WHETHER COMPLICATED (HHS-HCC): ICD-10-CM

## 2024-04-23 DIAGNOSIS — F41.9 ANXIETY: Primary | ICD-10-CM

## 2024-04-23 PROCEDURE — 99214 OFFICE O/P EST MOD 30 MIN: CPT | Performed by: NURSE PRACTITIONER

## 2024-04-23 RX ORDER — BENZOYL PEROXIDE 10 G/100G
1 GEL TOPICAL DAILY
COMMUNITY

## 2024-04-23 RX ORDER — ADAPALENE 0.1 G/100G
1 CREAM TOPICAL NIGHTLY
COMMUNITY

## 2024-04-23 RX ORDER — ALPRAZOLAM 0.25 MG/1
0.25 TABLET ORAL 3 TIMES DAILY PRN
Qty: 30 TABLET | Refills: 0 | Status: SHIPPED | OUTPATIENT
Start: 2024-04-23 | End: 2024-12-19

## 2024-04-23 RX ORDER — CETIRIZINE HYDROCHLORIDE 5 MG/1
5 TABLET ORAL DAILY
COMMUNITY

## 2024-04-23 NOTE — PROGRESS NOTES
"Subjective   Patient ID: Shruti Graham is a 38 y.o. female who presents for Establish Care.    Used to see Dr. Pritchard. She sees Tanya Escobar for the vaginal cream from [facility] pharmacy, and topical spironolactone is from Dr. Shelton     [facility]'s has a 5% spironolactone cream in a 60 gram tube monthly (for acne).     She uses the Qvar inhaler as needed.    She is working with a therapist once monthly and has had anxiety her whole life.    She is an  at [employer] for 16 years. She is single, has lots of friends, her parents live in [place], she has a brother who she is not close with.     She has seasonal depression, brother takes meds for mood, but patient is unsure which medication.     She does heavy weight lifting and takes creatine daily    Dr. Pritchard diagnosed her with IBS, because she has diarrhea with her anxiety/panic attacks and she has to eat a bland diet and limit fruits and veggies. She is also lactose intolerant.       Review of Systems   Constitutional:  Negative for chills, fatigue and fever.   HENT:  Negative for congestion, ear pain, rhinorrhea, sinus pressure and sore throat.    Eyes:  Negative for pain, discharge and itching.   Respiratory:  Negative for cough, shortness of breath and wheezing.    Cardiovascular:  Negative for chest pain and palpitations.   Gastrointestinal:  Negative for constipation, diarrhea, nausea and vomiting.   Genitourinary:  Negative for difficulty urinating and dysuria.   Musculoskeletal:  Negative for back pain, joint swelling and myalgias.   Skin:  Negative for color change.   Neurological:  Negative for headaches.   Hematological:  Negative for adenopathy.   Psychiatric/Behavioral:  Negative for decreased concentration. The patient is nervous/anxious.        Objective   /74   Pulse 74   Resp 18   Ht 1.651 m (5' 5\")   Wt 58.1 kg (128 lb)   SpO2 98%   BMI 21.30 kg/m²     Physical Exam  Constitutional:       General: She is not in acute distress.     " Appearance: She is not ill-appearing.   HENT:      Head: Normocephalic and atraumatic.      Right Ear: Tympanic membrane, ear canal and external ear normal.      Left Ear: Tympanic membrane, ear canal and external ear normal.      Nose: Nose normal.      Mouth/Throat:      Mouth: Mucous membranes are moist.      Pharynx: Oropharynx is clear.   Eyes:      Conjunctiva/sclera: Conjunctivae normal.      Pupils: Pupils are equal, round, and reactive to light.   Cardiovascular:      Rate and Rhythm: Normal rate and regular rhythm.      Pulses: Normal pulses.      Heart sounds: Normal heart sounds.   Pulmonary:      Effort: Pulmonary effort is normal. No respiratory distress.      Breath sounds: Normal breath sounds.   Abdominal:      General: Bowel sounds are normal.      Palpations: Abdomen is soft.      Tenderness: There is no abdominal tenderness.   Musculoskeletal:         General: Normal range of motion.   Skin:     General: Skin is warm and dry.   Neurological:      General: No focal deficit present.      Mental Status: She is alert and oriented to person, place, and time.   Psychiatric:         Mood and Affect: Mood is anxious. Mood is not depressed. Affect is not tearful.         Behavior: Behavior normal.         Thought Content: Thought content normal.         Judgment: Judgment normal.         Assessment/Plan   Problem List Items Addressed This Visit       Asthma ([facility])     4.23.24- takes Qvar as needed          Other Visit Diagnoses       Anxiety    -  Primary    Relevant Medications    ALPRAZolam (Xanax) 0.25 mg tablet          Patient Instructions   Patient to start taking prozac daily as ordered, and xanax as needed. Follow-up in the office in 2 months, or sooner if needed. Call the office if any problems or concerns in the meantime.     More than 50% of the visit was spent counseling the patient. A total of more than 45 minutes was spent.

## 2024-04-24 DIAGNOSIS — F41.9 ANXIETY: ICD-10-CM

## 2024-04-24 DIAGNOSIS — L70.0 ACNE VULGARIS: Primary | ICD-10-CM

## 2024-04-24 RX ORDER — FLUOXETINE 10 MG/1
10 TABLET ORAL DAILY
Qty: 30 TABLET | Refills: 1 | Status: SHIPPED | OUTPATIENT
Start: 2024-04-24 | End: 2024-06-11 | Stop reason: SDUPTHER

## 2024-04-24 RX ORDER — FLUOXETINE 10 MG/1
10 TABLET ORAL DAILY
Qty: 30 TABLET | Refills: 1 | Status: SHIPPED | OUTPATIENT
Start: 2024-04-24 | End: 2024-04-24

## 2024-04-30 ASSESSMENT — ENCOUNTER SYMPTOMS
ADENOPATHY: 0
WHEEZING: 0
DIARRHEA: 0
CONSTIPATION: 0
SHORTNESS OF BREATH: 0
FEVER: 0
SINUS PRESSURE: 0
NERVOUS/ANXIOUS: 1
RHINORRHEA: 0
COUGH: 0
FATIGUE: 0
VOMITING: 0
DYSURIA: 0
MYALGIAS: 0
CHILLS: 0
SORE THROAT: 0
EYE ITCHING: 0
EYE PAIN: 0
COLOR CHANGE: 0
JOINT SWELLING: 0
NAUSEA: 0
PALPITATIONS: 0
DIFFICULTY URINATING: 0
HEADACHES: 0
DECREASED CONCENTRATION: 0
BACK PAIN: 0
EYE DISCHARGE: 0

## 2024-04-30 NOTE — PATIENT INSTRUCTIONS
Patient to start taking prozac daily as ordered, and xanax as needed. Follow-up in the office in 2 months, or sooner if needed. Call the office if any problems or concerns in the meantime.     More than 50% of the visit was spent counseling the patient. A total of more than 45 minutes was spent.

## 2024-06-11 DIAGNOSIS — F41.9 ANXIETY: ICD-10-CM

## 2024-06-11 RX ORDER — FLUOXETINE 10 MG/1
10 TABLET ORAL DAILY
Qty: 90 TABLET | Refills: 1 | Status: SHIPPED | OUTPATIENT
Start: 2024-06-11 | End: 2024-12-08

## 2024-07-09 ENCOUNTER — APPOINTMENT (OUTPATIENT)
Dept: PRIMARY CARE | Facility: CLINIC | Age: 39
End: 2024-07-09
Payer: COMMERCIAL

## 2024-07-09 VITALS
HEART RATE: 75 BPM | OXYGEN SATURATION: 90 % | RESPIRATION RATE: 18 BRPM | WEIGHT: 131 LBS | HEIGHT: 65 IN | SYSTOLIC BLOOD PRESSURE: 118 MMHG | BODY MASS INDEX: 21.83 KG/M2 | DIASTOLIC BLOOD PRESSURE: 62 MMHG

## 2024-07-09 DIAGNOSIS — F41.9 ANXIETY: ICD-10-CM

## 2024-07-09 DIAGNOSIS — Z00.00 PHYSICAL EXAM, ANNUAL: Primary | ICD-10-CM

## 2024-07-09 PROCEDURE — 3008F BODY MASS INDEX DOCD: CPT | Performed by: NURSE PRACTITIONER

## 2024-07-09 PROCEDURE — 99395 PREV VISIT EST AGE 18-39: CPT | Performed by: NURSE PRACTITIONER

## 2024-07-09 RX ORDER — BUSPIRONE HYDROCHLORIDE 5 MG/1
5 TABLET ORAL 2 TIMES DAILY
Qty: 60 TABLET | Refills: 2 | Status: SHIPPED | OUTPATIENT
Start: 2024-07-09 | End: 2024-10-07

## 2024-07-09 ASSESSMENT — PROMIS GLOBAL HEALTH SCALE
CARRYOUT_SOCIAL_ACTIVITIES: EXCELLENT
CARRYOUT_PHYSICAL_ACTIVITIES: COMPLETELY
RATE_PHYSICAL_HEALTH: EXCELLENT
RATE_GENERAL_HEALTH: VERY GOOD
RATE_AVERAGE_PAIN: 3
RATE_SOCIAL_SATISFACTION: VERY GOOD
RATE_QUALITY_OF_LIFE: EXCELLENT
RATE_MENTAL_HEALTH: GOOD
RATE_AVERAGE_FATIGUE: MILD
EMOTIONAL_PROBLEMS: SOMETIMES

## 2024-07-09 ASSESSMENT — ENCOUNTER SYMPTOMS
LIGHT-HEADEDNESS: 0
FATIGUE: 0
NAUSEA: 0
WEAKNESS: 0
COUGH: 0
HEADACHES: 0
NERVOUS/ANXIOUS: 1
UNEXPECTED WEIGHT CHANGE: 1
CHILLS: 0
SHORTNESS OF BREATH: 0
DIFFICULTY URINATING: 0
VOMITING: 0
SLEEP DISTURBANCE: 0
ABDOMINAL PAIN: 1
FEVER: 0
DIARRHEA: 1

## 2024-07-09 NOTE — PROGRESS NOTES
"Subjective   Patient ID: Shruti Graham is a 38 y.o. female who presents for Annual Exam.    HPI     Review of Systems    Objective   /62   Pulse 75   Resp 18   Ht 1.651 m (5' 5\")   Wt 59.4 kg (131 lb)   SpO2 90%   BMI 21.80 kg/m²     Physical Exam    Assessment/Plan   {Assess/PlanSmartLinks:81994}       "

## 2024-07-09 NOTE — PROGRESS NOTES
"Subjective   Patient ID: Shruti Graham is a 38 y.o. female who presents for Annual Exam.    HPI   Present for physical exam. Reports 3x/wk diarrhea after starting Prozac. Feels better mentally with anxiety and depression. Reports sleeping well at night. Exercises daily-resistance training.     Review of Systems   Constitutional:  Positive for unexpected weight change. Negative for chills, fatigue and fever.        Wt gain 5-7lbs since December.    Respiratory:  Negative for cough and shortness of breath.    Cardiovascular:  Negative for chest pain.   Gastrointestinal:  Positive for abdominal pain and diarrhea. Negative for nausea and vomiting.        Some-IBS   Genitourinary:  Negative for difficulty urinating.   Neurological:  Negative for weakness, light-headedness and headaches.   Psychiatric/Behavioral:  Negative for sleep disturbance. The patient is nervous/anxious.        Objective   /62   Pulse 75   Resp 18   Ht 1.651 m (5' 5\")   Wt 59.4 kg (131 lb)   SpO2 90%   BMI 21.80 kg/m²     Physical Exam  HENT:      Right Ear: Tympanic membrane, ear canal and external ear normal.      Left Ear: Tympanic membrane, ear canal and external ear normal.      Mouth/Throat:      Mouth: Mucous membranes are moist.   Eyes:      Pupils: Pupils are equal, round, and reactive to light.   Cardiovascular:      Rate and Rhythm: Normal rate and regular rhythm.   Pulmonary:      Effort: Pulmonary effort is normal.      Breath sounds: Normal breath sounds.   Musculoskeletal:         General: No swelling or tenderness.      Cervical back: Normal range of motion.   Skin:     General: Skin is warm.   Neurological:      Mental Status: She is alert and oriented to person, place, and time.   Psychiatric:         Mood and Affect: Mood normal.         Thought Content: Thought content normal.         Assessment/Plan   Problem List Items Addressed This Visit    None  Visit Diagnoses       Physical exam, annual    -  Primary    " Relevant Orders    Referral to Gynecology    Anxiety        Relevant Medications    busPIRone (Buspar) 5 mg tablet          Patient Instructions   Reviewed labs in detail. Encouraged to stop prozac, and start buspar for anxiety to see if diarrhea improves. Referred to GYN for pap. Follow-up in 3 months, or sooner if needed. Call the office if any problems or concerns in the meantime.

## 2024-07-20 NOTE — PATIENT INSTRUCTIONS
Reviewed labs in detail. Encouraged to stop prozac, and start buspar for anxiety to see if diarrhea improves. Referred to GYN for pap. Follow-up in 3 months, or sooner if needed. Call the office if any problems or concerns in the meantime.

## 2024-08-07 ENCOUNTER — LAB REQUISITION (OUTPATIENT)
Dept: LAB | Facility: HOSPITAL | Age: 39
End: 2024-08-07
Payer: COMMERCIAL

## 2024-08-07 DIAGNOSIS — Z02.89 ENCOUNTER FOR OTHER ADMINISTRATIVE EXAMINATIONS: ICD-10-CM

## 2024-08-07 LAB
ALBUMIN SERPL BCP-MCNC: 4.1 G/DL (ref 3.4–5)
ALP SERPL-CCNC: 59 U/L (ref 33–110)
ALT SERPL W P-5'-P-CCNC: 17 U/L (ref 7–45)
ANION GAP SERPL CALC-SCNC: 13 MMOL/L (ref 10–20)
APPEARANCE UR: CLEAR
AST SERPL W P-5'-P-CCNC: 20 U/L (ref 9–39)
BILIRUB SERPL-MCNC: 0.5 MG/DL (ref 0–1.2)
BILIRUB UR STRIP.AUTO-MCNC: NEGATIVE MG/DL
BUN SERPL-MCNC: 20 MG/DL (ref 6–23)
CALCIUM SERPL-MCNC: 9.3 MG/DL (ref 8.6–10.6)
CHLORIDE SERPL-SCNC: 104 MMOL/L (ref 98–107)
CHOLEST SERPL-MCNC: 217 MG/DL (ref 0–199)
CHOLESTEROL/HDL RATIO: 3
CO2 SERPL-SCNC: 26 MMOL/L (ref 21–32)
COLOR UR: NORMAL
CREAT SERPL-MCNC: 1.01 MG/DL (ref 0.5–1.05)
EGFRCR SERPLBLD CKD-EPI 2021: 74 ML/MIN/1.73M*2
ERYTHROCYTE [DISTWIDTH] IN BLOOD BY AUTOMATED COUNT: 12.5 % (ref 11.5–14.5)
GLUCOSE SERPL-MCNC: 89 MG/DL (ref 74–99)
GLUCOSE UR STRIP.AUTO-MCNC: NORMAL MG/DL
HCT VFR BLD AUTO: 41.5 % (ref 36–46)
HDLC SERPL-MCNC: 72.6 MG/DL
HGB BLD-MCNC: 13 G/DL (ref 12–16)
KETONES UR STRIP.AUTO-MCNC: NEGATIVE MG/DL
LDLC SERPL CALC-MCNC: 130 MG/DL
LEUKOCYTE ESTERASE UR QL STRIP.AUTO: NEGATIVE
MCH RBC QN AUTO: 28.4 PG (ref 26–34)
MCHC RBC AUTO-ENTMCNC: 31.3 G/DL (ref 32–36)
MCV RBC AUTO: 91 FL (ref 80–100)
NITRITE UR QL STRIP.AUTO: NEGATIVE
NON HDL CHOLESTEROL: 144 MG/DL (ref 0–149)
NRBC BLD-RTO: 0 /100 WBCS (ref 0–0)
PH UR STRIP.AUTO: 5.5 [PH]
PLATELET # BLD AUTO: 328 X10*3/UL (ref 150–450)
POTASSIUM SERPL-SCNC: 3.9 MMOL/L (ref 3.5–5.3)
PROT SERPL-MCNC: 6.7 G/DL (ref 6.4–8.2)
PROT UR STRIP.AUTO-MCNC: NEGATIVE MG/DL
RBC # BLD AUTO: 4.58 X10*6/UL (ref 4–5.2)
RBC # UR STRIP.AUTO: NEGATIVE /UL
SODIUM SERPL-SCNC: 139 MMOL/L (ref 136–145)
SP GR UR STRIP.AUTO: 1.02
TRIGL SERPL-MCNC: 70 MG/DL (ref 0–149)
UROBILINOGEN UR STRIP.AUTO-MCNC: NORMAL MG/DL
VLDL: 14 MG/DL (ref 0–40)
WBC # BLD AUTO: 6.2 X10*3/UL (ref 4.4–11.3)

## 2024-08-07 PROCEDURE — 81003 URINALYSIS AUTO W/O SCOPE: CPT

## 2024-08-07 PROCEDURE — 80053 COMPREHEN METABOLIC PANEL: CPT

## 2024-08-07 PROCEDURE — 80061 LIPID PANEL: CPT

## 2024-08-07 PROCEDURE — 85027 COMPLETE CBC AUTOMATED: CPT

## 2024-08-21 ENCOUNTER — APPOINTMENT (OUTPATIENT)
Dept: OBSTETRICS AND GYNECOLOGY | Facility: CLINIC | Age: 39
End: 2024-08-21
Payer: COMMERCIAL

## 2024-08-21 VITALS
SYSTOLIC BLOOD PRESSURE: 121 MMHG | WEIGHT: 132.6 LBS | DIASTOLIC BLOOD PRESSURE: 81 MMHG | BODY MASS INDEX: 22.09 KG/M2 | HEIGHT: 65 IN

## 2024-08-21 DIAGNOSIS — Z12.4 CERVICAL CANCER SCREENING: ICD-10-CM

## 2024-08-21 DIAGNOSIS — Z11.3 SCREENING FOR STD (SEXUALLY TRANSMITTED DISEASE): ICD-10-CM

## 2024-08-21 DIAGNOSIS — Z01.419 WELL WOMAN EXAM WITH ROUTINE GYNECOLOGICAL EXAM: Primary | ICD-10-CM

## 2024-08-21 PROCEDURE — 87591 N.GONORRHOEAE DNA AMP PROB: CPT

## 2024-08-21 PROCEDURE — 99385 PREV VISIT NEW AGE 18-39: CPT

## 2024-08-21 PROCEDURE — 1036F TOBACCO NON-USER: CPT

## 2024-08-21 PROCEDURE — 3008F BODY MASS INDEX DOCD: CPT

## 2024-08-21 PROCEDURE — 87624 HPV HI-RISK TYP POOLED RSLT: CPT

## 2024-08-21 PROCEDURE — 87491 CHLMYD TRACH DNA AMP PROBE: CPT

## 2024-08-21 RX ORDER — LORATADINE 10 MG
10 TABLET,DISINTEGRATING ORAL DAILY
COMMUNITY

## 2024-08-21 NOTE — PROGRESS NOTES
"Subjective   Shruti Graham is a 38 y.o. female who is here for a routine exam.     GYN & Sexual Hx.:   - Last pap 5/16/2017, normal.  - History of abnormal Pap smear: no  - Contraception: Partner has vasectomy.  - Menstrual Periods: Regular, monthly.    History of vestibulodynia.   Worked previously with CAESAR Escobar in urology for this.    Patient of pelvic floor PT for stress incontinence.    OB Hx.:   G0    Regular self breast exam: no  Family history of breast cancer: no    Occupation:      Diet: general  Exercise: regularly as directed, power-lifting.    Review of Systems   All other systems reviewed and are negative.      Objective   /81   Ht 1.657 m (5' 5.25\")   Wt 60.1 kg (132 lb 9.6 oz)   LMP  (LMP Unknown)   BMI 21.90 kg/m²      General:   alert and oriented, in no acute distress       Heart: regular rate and rhythm   Lungs: Normal respiratory effort.   Breasts: Soft, symmetric, no skin dimpling or nipple discharge, no palpable masses or axillary nodes.   Abdomen: soft, non-tender, without masses or organomegaly   Vulva: normal, Bartholin's, Urethra, Ramona's normal   Vagina: normal mucosa, normal discharge   Cervix: no lesions           Neuro: age-appropriate affect, behavior and speech, no gross motor deficits, normal gait     Assessment      38 y.o. G0 woman for annual GYN exam.     - Health Maintenance --> Routine follow up with PCP for health maintenance examination encouraged, including TSH, cholesterol, and Vit. D evaluation.  Self breast exam encouraged; concerning characteristics of breasts reviewed - Pt. will report general concerns, any adherent lumps, skin dimpling/puckering or color changes, and any nipple discharge.  Diet and exercise reviewed.   Pap done today - Reviewed ACOG and ASCCP guidelines with pt. If normal and HPV negative, will repeat in 5 years.     - Desires STI screening.     - Referral provided to Rishabh Pruitt for vestibulodynia.      - F/U 1 year or as " needed.    Tricia Terrazas, JORGE ALBERTO-SAMANTHAM

## 2024-08-22 LAB
C TRACH RRNA SPEC QL NAA+PROBE: NEGATIVE
N GONORRHOEA DNA SPEC QL PROBE+SIG AMP: NEGATIVE

## 2024-08-29 LAB
CYTOLOGY CMNT CVX/VAG CYTO-IMP: NORMAL
HPV HR 12 DNA GENITAL QL NAA+PROBE: NEGATIVE
HPV HR GENOTYPES PNL CVX NAA+PROBE: NEGATIVE
HPV16 DNA SPEC QL NAA+PROBE: NEGATIVE
HPV18 DNA SPEC QL NAA+PROBE: NEGATIVE
LAB AP HPV GENOTYPE QUESTION: YES
LAB AP HPV HR: NORMAL
LAB AP PAP ADDITIONAL TESTS: NORMAL
LABORATORY COMMENT REPORT: NORMAL
PATH REPORT.TOTAL CANCER: NORMAL

## 2024-09-23 DIAGNOSIS — F41.9 ANXIETY: ICD-10-CM

## 2024-09-23 RX ORDER — BUSPIRONE HYDROCHLORIDE 5 MG/1
5 TABLET ORAL 3 TIMES DAILY
Qty: 270 TABLET | Refills: 1 | Status: SHIPPED | OUTPATIENT
Start: 2024-09-23 | End: 2025-03-22

## 2024-10-14 ENCOUNTER — APPOINTMENT (OUTPATIENT)
Dept: PRIMARY CARE | Facility: CLINIC | Age: 39
End: 2024-10-14
Payer: COMMERCIAL

## 2025-01-07 ENCOUNTER — APPOINTMENT (OUTPATIENT)
Dept: PRIMARY CARE | Facility: CLINIC | Age: 40
End: 2025-01-07
Payer: COMMERCIAL

## 2025-01-07 VITALS
OXYGEN SATURATION: 100 % | RESPIRATION RATE: 16 BRPM | DIASTOLIC BLOOD PRESSURE: 70 MMHG | HEART RATE: 78 BPM | HEIGHT: 65 IN | WEIGHT: 127.6 LBS | BODY MASS INDEX: 21.26 KG/M2 | SYSTOLIC BLOOD PRESSURE: 112 MMHG | TEMPERATURE: 98 F

## 2025-01-07 DIAGNOSIS — E55.9 VITAMIN D DEFICIENCY: ICD-10-CM

## 2025-01-07 DIAGNOSIS — R79.89 TSH ELEVATION: ICD-10-CM

## 2025-01-07 DIAGNOSIS — N94.819 VULVODYNIA: ICD-10-CM

## 2025-01-07 DIAGNOSIS — Z13.220 LIPID SCREENING: ICD-10-CM

## 2025-01-07 DIAGNOSIS — F41.9 ANXIETY: ICD-10-CM

## 2025-01-07 DIAGNOSIS — N92.6 ABNORMAL MENSTRUAL PERIODS: ICD-10-CM

## 2025-01-07 DIAGNOSIS — J45.20 MILD INTERMITTENT ASTHMA, UNSPECIFIED WHETHER COMPLICATED (HHS-HCC): ICD-10-CM

## 2025-01-07 DIAGNOSIS — Z00.00 PHYSICAL EXAM, ANNUAL: Primary | ICD-10-CM

## 2025-01-07 PROCEDURE — 99395 PREV VISIT EST AGE 18-39: CPT | Performed by: NURSE PRACTITIONER

## 2025-01-07 PROCEDURE — 3008F BODY MASS INDEX DOCD: CPT | Performed by: NURSE PRACTITIONER

## 2025-01-07 RX ORDER — ALBUTEROL SULFATE 90 UG/1
2 INHALANT RESPIRATORY (INHALATION) EVERY 4 HOURS PRN
Qty: 6.7 G | Refills: 5 | Status: SHIPPED | OUTPATIENT
Start: 2025-01-07 | End: 2026-01-07

## 2025-01-07 RX ORDER — BECLOMETHASONE DIPROPIONATE HFA 80 UG/1
2 AEROSOL, METERED RESPIRATORY (INHALATION) 2 TIMES DAILY
Qty: 10.6 G | Refills: 2 | Status: SHIPPED | OUTPATIENT
Start: 2025-01-07

## 2025-01-07 RX ORDER — BUSPIRONE HYDROCHLORIDE 5 MG/1
5 TABLET ORAL 3 TIMES DAILY
Qty: 270 TABLET | Refills: 3 | Status: SHIPPED | OUTPATIENT
Start: 2025-01-07 | End: 2026-01-02

## 2025-01-07 RX ORDER — CLOTRIMAZOLE AND BETAMETHASONE DIPROPIONATE 10; .64 MG/G; MG/G
1 CREAM TOPICAL 2 TIMES DAILY
Qty: 45 G | Refills: 3 | Status: SHIPPED | OUTPATIENT
Start: 2025-01-07 | End: 2025-05-07

## 2025-01-07 ASSESSMENT — ENCOUNTER SYMPTOMS
DIARRHEA: 0
EYE ITCHING: 0
DIFFICULTY URINATING: 0
CHILLS: 0
SORE THROAT: 0
JOINT SWELLING: 0
COLOR CHANGE: 0
MYALGIAS: 0
SHORTNESS OF BREATH: 0
FATIGUE: 0
COUGH: 0
WHEEZING: 0
CONSTIPATION: 0
EYE PAIN: 0
RHINORRHEA: 0
PALPITATIONS: 0
DYSURIA: 0
FEVER: 0
NERVOUS/ANXIOUS: 0
HEADACHES: 0
VOMITING: 0
SINUS PRESSURE: 0
ADENOPATHY: 0
NAUSEA: 0
BACK PAIN: 0
DECREASED CONCENTRATION: 0
EYE DISCHARGE: 0

## 2025-01-07 ASSESSMENT — PAIN SCALES - GENERAL: PAINLEVEL_OUTOF10: 0-NO PAIN

## 2025-01-07 NOTE — PROGRESS NOTES
Subjective   Patient ID: Shruti Graham is a 39 y.o. female who presents for Annual Exam.    Well Adult Physical   Patient here for a comprehensive physical exam.The patient reports no problems: wants to do a hormone panel due to menstrual cycles being off and longer then normal, hot flashes and vaginal itching as well as acne. She has been using differin cream that helped her face and back, but doesn't seem to helping as much anymore.    She used monistat for yeast which helped the discharge. She has clotrimazole-betamethasone cream for vuvodynia    Mom went through menopause in her 50s.     She went off birth control pill 3-4 years ago and periods were regular, but for the past few months they have been lasting 2 weeks mostly spotting, starting early or late.    Do you take any herbs or supplements that were not prescribed by a doctor? no   Are you taking calcium supplements? no   Are you taking aspirin daily? No    Recommended Prevnar 20 vaccine for hx of asthma, but patient would like to check with insurance coverage first.     Buspar is working very well, patient states that her IBS symptoms have completely resolved.      History:  LMP: No LMP recorded.  Menopause at n/a years  Last pap date: 2024  Abnormal pap? no  : 0  Para: 0         Review of Systems   Constitutional:  Negative for chills, fatigue and fever.   HENT:  Negative for congestion, ear pain, rhinorrhea, sinus pressure and sore throat.    Eyes:  Negative for pain, discharge and itching.   Respiratory:  Negative for cough, shortness of breath and wheezing.    Cardiovascular:  Negative for chest pain and palpitations.   Gastrointestinal:  Negative for constipation, diarrhea, nausea and vomiting.   Genitourinary:  Negative for difficulty urinating and dysuria.   Musculoskeletal:  Negative for back pain, joint swelling and myalgias.   Skin:  Negative for color change.   Neurological:  Negative for headaches.   Hematological:  Negative for  "adenopathy.   Psychiatric/Behavioral:  Negative for decreased concentration. The patient is not nervous/anxious.    All other systems reviewed and are negative.      Objective   /70   Pulse 78   Temp 36.7 °C (98 °F)   Resp 16   Ht 1.657 m (5' 5.25\")   Wt 57.9 kg (127 lb 9.6 oz)   SpO2 100%   BMI 21.07 kg/m²     Physical Exam  Constitutional:       General: She is not in acute distress.     Appearance: She is not ill-appearing.   HENT:      Head: Normocephalic and atraumatic.      Right Ear: Tympanic membrane, ear canal and external ear normal.      Left Ear: Tympanic membrane, ear canal and external ear normal.      Nose: Nose normal.      Mouth/Throat:      Mouth: Mucous membranes are moist.      Pharynx: Oropharynx is clear.   Eyes:      Conjunctiva/sclera: Conjunctivae normal.      Pupils: Pupils are equal, round, and reactive to light.   Cardiovascular:      Rate and Rhythm: Normal rate and regular rhythm.      Pulses: Normal pulses.      Heart sounds: Normal heart sounds.   Pulmonary:      Effort: Pulmonary effort is normal. No respiratory distress.      Breath sounds: Normal breath sounds.   Abdominal:      General: Bowel sounds are normal.      Palpations: Abdomen is soft.      Tenderness: There is no abdominal tenderness.   Musculoskeletal:         General: Normal range of motion.   Skin:     General: Skin is warm and dry.   Neurological:      General: No focal deficit present.      Mental Status: She is alert and oriented to person, place, and time.   Psychiatric:         Mood and Affect: Mood normal.         Behavior: Behavior normal.         Thought Content: Thought content normal.         Judgment: Judgment normal.         Assessment/Plan   Problem List Items Addressed This Visit       TSH elevation    Relevant Orders    Tsh With Reflex To Free T4 If Abnormal (Completed)    Vulvodynia    Relevant Medications    clotrimazole-betamethasone (Lotrisone) cream    Asthma    Relevant Medications    " beclomethasone dipropionate (Qvar RediHaler) 80 mcg/actuation inhaler    albuterol (Proventil HFA) 90 mcg/actuation inhaler     Other Visit Diagnoses       Physical exam, annual    -  Primary    Anxiety        Relevant Medications    busPIRone (Buspar) 5 mg tablet    Other Relevant Orders    CBC and Auto Differential (Completed)    Comprehensive Metabolic Panel (Completed)    Vitamin D deficiency        Relevant Orders    Vitamin D 25-Hydroxy,Total (for eval of Vitamin D levels)    Abnormal menstrual periods        Relevant Orders    17-Hydroxyprogesterone    Testosterone, total and free    FSH & LH (Completed)    Lipid screening        Relevant Orders    Lipid Panel (Completed)          Patient Instructions   Patient to continue medications as ordered. Have fasting labs drawn, and we will call with results when available. Follow-up in 1 year, or sooner if needed. Call the office if any problems or concerns in the meantime.

## 2025-01-10 ENCOUNTER — LAB (OUTPATIENT)
Dept: LAB | Facility: LAB | Age: 40
End: 2025-01-10
Payer: COMMERCIAL

## 2025-01-10 DIAGNOSIS — R79.89 TSH ELEVATION: ICD-10-CM

## 2025-01-10 DIAGNOSIS — F41.9 ANXIETY: ICD-10-CM

## 2025-01-10 DIAGNOSIS — Z13.220 LIPID SCREENING: ICD-10-CM

## 2025-01-10 DIAGNOSIS — Z11.3 SCREENING FOR STD (SEXUALLY TRANSMITTED DISEASE): ICD-10-CM

## 2025-01-10 DIAGNOSIS — N92.6 ABNORMAL MENSTRUAL PERIODS: ICD-10-CM

## 2025-01-10 LAB
ALBUMIN SERPL BCP-MCNC: 4.1 G/DL (ref 3.4–5)
ALP SERPL-CCNC: 53 U/L (ref 33–110)
ALT SERPL W P-5'-P-CCNC: 13 U/L (ref 7–45)
ANION GAP SERPL CALC-SCNC: 11 MMOL/L (ref 10–20)
AST SERPL W P-5'-P-CCNC: 18 U/L (ref 9–39)
BASOPHILS # BLD AUTO: 0.06 X10*3/UL (ref 0–0.1)
BASOPHILS NFR BLD AUTO: 0.9 %
BILIRUB SERPL-MCNC: 0.7 MG/DL (ref 0–1.2)
BUN SERPL-MCNC: 15 MG/DL (ref 6–23)
CALCIUM SERPL-MCNC: 8.9 MG/DL (ref 8.6–10.3)
CHLORIDE SERPL-SCNC: 106 MMOL/L (ref 98–107)
CHOLEST SERPL-MCNC: 199 MG/DL (ref 0–199)
CHOLESTEROL/HDL RATIO: 3.2
CO2 SERPL-SCNC: 25 MMOL/L (ref 21–32)
CREAT SERPL-MCNC: 1.01 MG/DL (ref 0.5–1.05)
EGFRCR SERPLBLD CKD-EPI 2021: 73 ML/MIN/1.73M*2
EOSINOPHIL # BLD AUTO: 0.15 X10*3/UL (ref 0–0.7)
EOSINOPHIL NFR BLD AUTO: 2.2 %
ERYTHROCYTE [DISTWIDTH] IN BLOOD BY AUTOMATED COUNT: 12.4 % (ref 11.5–14.5)
FSH SERPL-ACNC: 4.4 IU/L
GLUCOSE SERPL-MCNC: 77 MG/DL (ref 74–99)
HBV SURFACE AG SERPL QL IA: NONREACTIVE
HCT VFR BLD AUTO: 38.4 % (ref 36–46)
HCV AB SER QL: NONREACTIVE
HDLC SERPL-MCNC: 63.1 MG/DL
HGB BLD-MCNC: 12.6 G/DL (ref 12–16)
HIV 1+2 AB+HIV1 P24 AG SERPL QL IA: NONREACTIVE
IMM GRANULOCYTES # BLD AUTO: 0.01 X10*3/UL (ref 0–0.7)
IMM GRANULOCYTES NFR BLD AUTO: 0.1 % (ref 0–0.9)
LDLC SERPL CALC-MCNC: 121 MG/DL
LH SERPL-ACNC: 8.5 IU/L
LYMPHOCYTES # BLD AUTO: 2.97 X10*3/UL (ref 1.2–4.8)
LYMPHOCYTES NFR BLD AUTO: 43.9 %
MCH RBC QN AUTO: 29.1 PG (ref 26–34)
MCHC RBC AUTO-ENTMCNC: 32.8 G/DL (ref 32–36)
MCV RBC AUTO: 89 FL (ref 80–100)
MONOCYTES # BLD AUTO: 0.57 X10*3/UL (ref 0.1–1)
MONOCYTES NFR BLD AUTO: 8.4 %
NEUTROPHILS # BLD AUTO: 3.01 X10*3/UL (ref 1.2–7.7)
NEUTROPHILS NFR BLD AUTO: 44.5 %
NON HDL CHOLESTEROL: 136 MG/DL (ref 0–149)
NRBC BLD-RTO: 0 /100 WBCS (ref 0–0)
PLATELET # BLD AUTO: 305 X10*3/UL (ref 150–450)
POTASSIUM SERPL-SCNC: 4.1 MMOL/L (ref 3.5–5.3)
PROT SERPL-MCNC: 6.2 G/DL (ref 6.4–8.2)
RBC # BLD AUTO: 4.33 X10*6/UL (ref 4–5.2)
SODIUM SERPL-SCNC: 138 MMOL/L (ref 136–145)
T4 FREE SERPL-MCNC: 0.73 NG/DL (ref 0.61–1.12)
TREPONEMA PALLIDUM IGG+IGM AB [PRESENCE] IN SERUM OR PLASMA BY IMMUNOASSAY: NONREACTIVE
TRIGL SERPL-MCNC: 76 MG/DL (ref 0–149)
TSH SERPL-ACNC: 4.67 MIU/L (ref 0.44–3.98)
VLDL: 15 MG/DL (ref 0–40)
WBC # BLD AUTO: 6.8 X10*3/UL (ref 4.4–11.3)

## 2025-01-10 PROCEDURE — 86803 HEPATITIS C AB TEST: CPT

## 2025-01-10 PROCEDURE — 84439 ASSAY OF FREE THYROXINE: CPT

## 2025-01-10 PROCEDURE — 80061 LIPID PANEL: CPT

## 2025-01-10 PROCEDURE — 36415 COLL VENOUS BLD VENIPUNCTURE: CPT

## 2025-01-10 PROCEDURE — 85025 COMPLETE CBC W/AUTO DIFF WBC: CPT

## 2025-01-10 PROCEDURE — 87389 HIV-1 AG W/HIV-1&-2 AB AG IA: CPT

## 2025-01-10 PROCEDURE — 83002 ASSAY OF GONADOTROPIN (LH): CPT

## 2025-01-10 PROCEDURE — 84402 ASSAY OF FREE TESTOSTERONE: CPT

## 2025-01-10 PROCEDURE — 83498 ASY HYDROXYPROGESTERONE 17-D: CPT

## 2025-01-10 PROCEDURE — 87340 HEPATITIS B SURFACE AG IA: CPT

## 2025-01-10 PROCEDURE — 83001 ASSAY OF GONADOTROPIN (FSH): CPT

## 2025-01-10 PROCEDURE — 86780 TREPONEMA PALLIDUM: CPT

## 2025-01-10 PROCEDURE — 80053 COMPREHEN METABOLIC PANEL: CPT

## 2025-01-10 PROCEDURE — 84443 ASSAY THYROID STIM HORMONE: CPT

## 2025-01-13 LAB
TESTOSTERONE FREE (CHAN): 2.9 PG/ML (ref 0.1–6.4)
TESTOSTERONE,TOTAL,LC-MS/MS: 25 NG/DL (ref 2–45)

## 2025-01-15 LAB — 17OHP SERPL-MCNC: 213.23 NG/DL

## 2025-01-22 ENCOUNTER — TELEPHONE (OUTPATIENT)
Dept: PRIMARY CARE | Facility: CLINIC | Age: 40
End: 2025-01-22
Payer: COMMERCIAL

## 2025-01-22 NOTE — TELEPHONE ENCOUNTER
[facility]  the  RX  - QVAR  is not covered  but the insurance will cover ELIPTA (?) or Asmanex HFA      This is  a patient of [employer] , patient   was  seen in office 1.7.2025 by [employer] ,please advise, if you can help with  this .  Thank you !

## 2025-02-08 DIAGNOSIS — J45.20 MILD INTERMITTENT ASTHMA, UNSPECIFIED WHETHER COMPLICATED (HHS-HCC): Primary | ICD-10-CM

## 2025-02-08 RX ORDER — MOMETASONE FUROATE 100 UG/1
2 AEROSOL RESPIRATORY (INHALATION) 2 TIMES DAILY
Qty: 13 G | Refills: 2 | Status: SHIPPED | OUTPATIENT
Start: 2025-02-08 | End: 2025-05-09

## 2025-02-10 ENCOUNTER — TELEMEDICINE (OUTPATIENT)
Dept: PRIMARY CARE | Facility: CLINIC | Age: 40
End: 2025-02-10
Payer: COMMERCIAL

## 2025-02-10 DIAGNOSIS — K56.49: Primary | ICD-10-CM

## 2025-02-10 DIAGNOSIS — H53.429: ICD-10-CM

## 2025-02-10 PROCEDURE — 99213 OFFICE O/P EST LOW 20 MIN: CPT | Performed by: FAMILY MEDICINE

## 2025-02-10 ASSESSMENT — ENCOUNTER SYMPTOMS
DIZZINESS: 0
FEVER: 0
FATIGUE: 0
HEADACHES: 0
SHORTNESS OF BREATH: 0
ACTIVITY CHANGE: 0

## 2025-02-10 NOTE — PROGRESS NOTES
Subjective   Patient ID: Shruti Graham is a 39 y.o. female who presents for No chief complaint on file..    Virtual or Telephone Consent    An interactive audio and video telecommunication system which permits real time communications between the patient (at the originating site) and provider (at the distant site) was utilized to provide this telehealth service.   Verbal consent was requested and obtained from Shruti Graham on this date, 02/10/25 for a telehealth visit.         Visual Disturbances   - reports she had issues with visual disturbance approximately 4 days ago   - symptoms lasted for around 20 minutes   - no history of migraines   - no similar episodes of visual disturbance in the past   - reports symptoms resolved spontaneously and has been normal since  - no repeat   - denies any hearing issues, balance issues, numbness tingling weakness or headaches   - has started adding lubricating eye drops          Review of Systems   Constitutional:  Negative for activity change, fatigue and fever.   Respiratory:  Negative for shortness of breath.    Cardiovascular:  Negative for chest pain.   Neurological:  Negative for dizziness and headaches.       Objective   There were no vitals taken for this visit.    Physical Exam  Constitutional:       Appearance: Normal appearance.   Neurological:      Mental Status: She is alert.   Psychiatric:         Mood and Affect: Mood normal.         Behavior: Behavior normal.         Assessment/Plan   Problem List Items Addressed This Visit    None  Visit Diagnoses         Codes    Scatoma (Multi)    -  Primary K56.49    stable   - referral to neurology   - MRI ordered for new symptoms   - f/u with specialist     Relevant Orders    MR brain wo IV contrast    Referral to Neurology

## 2025-02-11 ENCOUNTER — PATIENT MESSAGE (OUTPATIENT)
Dept: PRIMARY CARE | Facility: CLINIC | Age: 40
End: 2025-02-11
Payer: COMMERCIAL

## 2025-04-12 SDOH — HEALTH STABILITY: PHYSICAL HEALTH: ON AVERAGE, HOW MANY MINUTES DO YOU ENGAGE IN EXERCISE AT THIS LEVEL?: 120 MIN

## 2025-04-12 SDOH — HEALTH STABILITY: PHYSICAL HEALTH: ON AVERAGE, HOW MANY DAYS PER WEEK DO YOU ENGAGE IN MODERATE TO STRENUOUS EXERCISE (LIKE A BRISK WALK)?: 4 DAYS

## 2025-04-14 ENCOUNTER — OFFICE VISIT (OUTPATIENT)
Dept: PRIMARY CARE CLINIC | Age: 40
End: 2025-04-14
Payer: COMMERCIAL

## 2025-04-14 VITALS
BODY MASS INDEX: 20.93 KG/M2 | DIASTOLIC BLOOD PRESSURE: 74 MMHG | OXYGEN SATURATION: 95 % | HEIGHT: 65 IN | WEIGHT: 125.6 LBS | SYSTOLIC BLOOD PRESSURE: 108 MMHG | HEART RATE: 76 BPM

## 2025-04-14 DIAGNOSIS — J45.20 MILD INTERMITTENT ASTHMA WITHOUT COMPLICATION: ICD-10-CM

## 2025-04-14 DIAGNOSIS — Z00.00 HEALTH CARE MAINTENANCE: ICD-10-CM

## 2025-04-14 DIAGNOSIS — R10.33 PERIUMBILICAL ABDOMINAL PAIN: Primary | ICD-10-CM

## 2025-04-14 DIAGNOSIS — E78.00 ELEVATED LDL CHOLESTEROL LEVEL: ICD-10-CM

## 2025-04-14 DIAGNOSIS — K58.2 IRRITABLE BOWEL SYNDROME WITH BOTH CONSTIPATION AND DIARRHEA: ICD-10-CM

## 2025-04-14 DIAGNOSIS — F41.9 ANXIETY: ICD-10-CM

## 2025-04-14 DIAGNOSIS — N39.3 STRESS INCONTINENCE, FEMALE: ICD-10-CM

## 2025-04-14 PROBLEM — J45.909 ASTHMA: Status: ACTIVE | Noted: 2023-10-10

## 2025-04-14 PROBLEM — K58.9 IBS (IRRITABLE BOWEL SYNDROME): Status: ACTIVE | Noted: 2023-08-07

## 2025-04-14 PROCEDURE — 99204 OFFICE O/P NEW MOD 45 MIN: CPT | Performed by: STUDENT IN AN ORGANIZED HEALTH CARE EDUCATION/TRAINING PROGRAM

## 2025-04-14 RX ORDER — ALBUTEROL SULFATE 90 UG/1
2 INHALANT RESPIRATORY (INHALATION) EVERY 4 HOURS PRN
COMMUNITY
Start: 2025-01-07 | End: 2026-01-07

## 2025-04-14 RX ORDER — CETIRIZINE HYDROCHLORIDE 5 MG/1
5 TABLET ORAL DAILY
COMMUNITY

## 2025-04-14 RX ORDER — BUSPIRONE HYDROCHLORIDE 5 MG/1
5 TABLET ORAL 2 TIMES DAILY
COMMUNITY
Start: 2025-01-07 | End: 2026-01-02

## 2025-04-14 RX ORDER — FLUTICASONE PROPIONATE 50 MCG
1 SPRAY, SUSPENSION (ML) NASAL DAILY
COMMUNITY

## 2025-04-14 RX ORDER — BENZOYL PEROXIDE 10 G/100G
1 GEL TOPICAL DAILY
COMMUNITY

## 2025-04-14 SDOH — ECONOMIC STABILITY: FOOD INSECURITY: WITHIN THE PAST 12 MONTHS, YOU WORRIED THAT YOUR FOOD WOULD RUN OUT BEFORE YOU GOT MONEY TO BUY MORE.: NEVER TRUE

## 2025-04-14 SDOH — ECONOMIC STABILITY: FOOD INSECURITY: WITHIN THE PAST 12 MONTHS, THE FOOD YOU BOUGHT JUST DIDN'T LAST AND YOU DIDN'T HAVE MONEY TO GET MORE.: NEVER TRUE

## 2025-04-14 ASSESSMENT — PATIENT HEALTH QUESTIONNAIRE - PHQ9
SUM OF ALL RESPONSES TO PHQ QUESTIONS 1-9: 0
SUM OF ALL RESPONSES TO PHQ QUESTIONS 1-9: 0
2. FEELING DOWN, DEPRESSED OR HOPELESS: NOT AT ALL
1. LITTLE INTEREST OR PLEASURE IN DOING THINGS: NOT AT ALL
SUM OF ALL RESPONSES TO PHQ QUESTIONS 1-9: 0
SUM OF ALL RESPONSES TO PHQ QUESTIONS 1-9: 0

## 2025-04-14 NOTE — ASSESSMENT & PLAN NOTE
Chronic; controlled with buspirone- continue   - short term has stopped ^ due to current dry eyes , will restart after eye sx resolved

## 2025-04-14 NOTE — ASSESSMENT & PLAN NOTE
Pap smear: up to date   Mammogram:   - no fam hx of breast cancer   Colonoscopy:   - no fam hx of colon cancer   - parents with HTN and HLD

## 2025-04-15 ENCOUNTER — HOSPITAL ENCOUNTER (OUTPATIENT)
Dept: ULTRASOUND IMAGING | Age: 40
Discharge: HOME | End: 2025-04-17
Payer: COMMERCIAL

## 2025-04-15 DIAGNOSIS — R10.33 PERIUMBILICAL ABDOMINAL PAIN: ICD-10-CM

## 2025-04-15 PROCEDURE — 76705 ECHO EXAM OF ABDOMEN: CPT

## 2025-04-17 ENCOUNTER — RESULTS FOLLOW-UP (OUTPATIENT)
Dept: PRIMARY CARE CLINIC | Age: 40
End: 2025-04-17

## 2025-04-17 DIAGNOSIS — K76.9 HEPATIC LESION: Primary | ICD-10-CM

## 2025-04-27 ENCOUNTER — OFFICE VISIT (OUTPATIENT)
Dept: URGENT CARE | Age: 40
End: 2025-04-27
Payer: COMMERCIAL

## 2025-04-27 VITALS
BODY MASS INDEX: 21.16 KG/M2 | HEIGHT: 65 IN | RESPIRATION RATE: 18 BRPM | DIASTOLIC BLOOD PRESSURE: 83 MMHG | WEIGHT: 127 LBS | TEMPERATURE: 98.9 F | HEART RATE: 104 BPM | OXYGEN SATURATION: 99 % | SYSTOLIC BLOOD PRESSURE: 120 MMHG
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
## 2025-04-27 DIAGNOSIS — S80.861A TICK BITE OF RIGHT LOWER LEG, INITIAL ENCOUNTER: Primary | ICD-10-CM

## 2025-04-27 PROCEDURE — 1036F TOBACCO NON-USER: CPT | Performed by: NURSE PRACTITIONER

## 2025-04-27 PROCEDURE — 99203 OFFICE O/P NEW LOW 30 MIN: CPT | Performed by: NURSE PRACTITIONER

## 2025-04-27 PROCEDURE — 3008F BODY MASS INDEX DOCD: CPT | Performed by: NURSE PRACTITIONER

## 2025-04-27 RX ORDER — DOXYCYCLINE HYCLATE 100 MG
200 TABLET ORAL ONCE
Qty: 2 TABLET | Refills: 0 | Status: SHIPPED | OUTPATIENT
Start: 2025-04-27 | End: 2025-04-27

## 2025-04-27 ASSESSMENT — ENCOUNTER SYMPTOMS
CHILLS: 0
COUGH: 0
ABDOMINAL PAIN: 0
SORE THROAT: 0
FEVER: 0

## 2025-04-27 ASSESSMENT — PATIENT HEALTH QUESTIONNAIRE - PHQ9
1. LITTLE INTEREST OR PLEASURE IN DOING THINGS: NOT AT ALL
SUM OF ALL RESPONSES TO PHQ9 QUESTIONS 1 & 2: 0
2. FEELING DOWN, DEPRESSED OR HOPELESS: NOT AT ALL

## 2025-04-27 ASSESSMENT — PAIN SCALES - GENERAL: PAINLEVEL_OUTOF10: 0-NO PAIN

## 2025-04-27 NOTE — PROGRESS NOTES
"Subjective   Patient ID: Shruti Graham is a 39 y.o. female. They present today with a chief complaint of Tick Removal (Back of right knee /Noticed this morning ).    History of Present Illness  HPI    Patient presents for possible tick bite to back of knee. States she cut the grass the other day and thinks she was exposed then. She shaved her legs on Wednesday and did not have this skin issue then. Cut the grass Thursday. There is a small black foreign object that is imbedded in patient's skin. No fever/chills. No redness or warmth around the area.     Past Medical History  Allergies as of 04/27/2025 - Reviewed 04/27/2025   Allergen Reaction Noted    Cephalosporins Hives 08/08/2023    Sulfa (sulfonamide antibiotics) Hives 10/10/2023       Prescriptions Prior to Admission[1]     Medical History[2]    Surgical History[3]     reports that she has never smoked. She has never been exposed to tobacco smoke. She has never used smokeless tobacco. She reports that she does not currently use alcohol. She reports that she does not use drugs.    Review of Systems  Review of Systems   Constitutional:  Negative for chills and fever.   HENT:  Negative for congestion, postnasal drip and sore throat.    Respiratory:  Negative for cough.    Gastrointestinal:  Negative for abdominal pain.   Skin:         Black foreign object imbedded into skin of posterior knee           Objective    Vitals:    04/27/25 1154   BP: 120/83   BP Location: Left arm   Patient Position: Sitting   Pulse: 104   Resp: 18   Temp: 37.2 °C (98.9 °F)   SpO2: 99%   Weight: 57.6 kg (127 lb)   Height: 1.651 m (5' 5\")     Patient's last menstrual period was 04/02/2025 (approximate).    Physical Exam  Constitutional:       General: She is not in acute distress.     Appearance: Normal appearance. She is not ill-appearing or toxic-appearing.   HENT:      Head: Normocephalic and atraumatic.      Right Ear: Hearing and external ear normal.      Left Ear: Hearing and " external ear normal.      Nose: Nose normal.      Mouth/Throat:      Lips: Pink.      Mouth: Mucous membranes are moist.   Cardiovascular:      Rate and Rhythm: Normal rate and regular rhythm.      Heart sounds: Normal heart sounds.   Pulmonary:      Effort: Pulmonary effort is normal. No respiratory distress.      Breath sounds: Normal breath sounds.   Skin:     General: Skin is warm and dry.          Neurological:      General: No focal deficit present.      Mental Status: She is alert.   Psychiatric:         Attention and Perception: Attention normal.         Mood and Affect: Mood normal.         Speech: Speech normal.         Behavior: Behavior normal.         Procedures    Point of Care Test & Imaging Results from this visit  No results found for this visit on 04/27/25.   Imaging  No results found.    Cardiology, Vascular, and Other Imaging  No other imaging results found for the past 2 days      Diagnostic study results (if any) were reviewed by MALI Falcon.    Assessment/Plan   Allergies, medications, history, and pertinent labs/EKGs/Imaging reviewed by MALI Falcon.     Medical Decision Making  Small object removed from skin and it appears to be the head of a tick. Rx for doxycycline for prophylaxis.  At time of discharge patient was clinically well-appearing and HDS for outpatient management. She was educated regarding diagnosis, supportive care, OTC and Rx medications. She was given the opportunity to ask questions prior to discharge.  They verbalized understanding of my discussion of the plans for treatment, expected course, indications to return to  or seek further evaluation in ED, and the need for timely follow up as directed.         Orders and Diagnoses  Diagnoses and all orders for this visit:  Tick bite of right lower leg, initial encounter  -     doxycycline (Vibra-Tabs) 100 mg tablet; Take 2 tablets (200 mg) by mouth 1 time for 1 dose. Take with a full glass of water  and do not lie down for at least 30 minutes after.      Medical Admin Record      Patient disposition: Home    Electronically signed by MALI Falcon  5:50 PM           [1] (Not in a hospital admission)   [2]   Past Medical History:  Diagnosis Date    Allergic     Anxiety     Arthritis     Asthma     Disorder of kidney and ureter, unspecified 08/27/2021    Renal insufficiency, mild    Other specified abnormal findings of blood chemistry     Elevated d-dimer   [3] History reviewed. No pertinent surgical history.

## 2025-05-14 PROBLEM — Z00.00 HEALTH CARE MAINTENANCE: Status: RESOLVED | Noted: 2025-04-14 | Resolved: 2025-05-14

## 2025-05-27 ENCOUNTER — OFFICE VISIT (OUTPATIENT)
Dept: PRIMARY CARE CLINIC | Age: 40
End: 2025-05-27
Payer: COMMERCIAL

## 2025-05-27 VITALS
BODY MASS INDEX: 20.99 KG/M2 | SYSTOLIC BLOOD PRESSURE: 112 MMHG | WEIGHT: 126 LBS | DIASTOLIC BLOOD PRESSURE: 68 MMHG | OXYGEN SATURATION: 100 % | HEART RATE: 77 BPM | HEIGHT: 65 IN

## 2025-05-27 DIAGNOSIS — N39.3 STRESS INCONTINENCE, FEMALE: ICD-10-CM

## 2025-05-27 DIAGNOSIS — K76.9 HEPATIC LESION: ICD-10-CM

## 2025-05-27 DIAGNOSIS — N39.492 POSTURAL URINARY INCONTINENCE: ICD-10-CM

## 2025-05-27 DIAGNOSIS — R20.0 RIGHT ARM NUMBNESS: Primary | ICD-10-CM

## 2025-05-27 PROCEDURE — G8427 DOCREV CUR MEDS BY ELIG CLIN: HCPCS | Performed by: STUDENT IN AN ORGANIZED HEALTH CARE EDUCATION/TRAINING PROGRAM

## 2025-05-27 PROCEDURE — 1036F TOBACCO NON-USER: CPT | Performed by: STUDENT IN AN ORGANIZED HEALTH CARE EDUCATION/TRAINING PROGRAM

## 2025-05-27 PROCEDURE — 99214 OFFICE O/P EST MOD 30 MIN: CPT | Performed by: STUDENT IN AN ORGANIZED HEALTH CARE EDUCATION/TRAINING PROGRAM

## 2025-05-27 PROCEDURE — G8420 CALC BMI NORM PARAMETERS: HCPCS | Performed by: STUDENT IN AN ORGANIZED HEALTH CARE EDUCATION/TRAINING PROGRAM

## 2025-05-27 NOTE — ASSESSMENT & PLAN NOTE
Chronic; uncontrolled: occurs with standing up , not normal for her ,   - pt will send me a [facility] provider, wants to see  a woman    - s/p pelvic floor therapy in the past

## 2025-05-27 NOTE — PROGRESS NOTES
[facility]  [address]  [address]  [facility] [address]  Dept: [phone]  Dept Fax: [phone]  Loc: [phone]     5/27/2025    Visit type: Office visit    Reason for Visit: Neck Pain (Pins and needles down arm requesting PT ) and Urinary Retention (Urinary leakage)       ASSESSMENT/PLAN   1. Right arm numbness  -     [facility] [place]  2. Postural urinary incontinence  -     Microscopic Urinalysis  3. Stress incontinence, female  Assessment & Plan:  Chronic; uncontrolled: occurs with standing up , not normal for her ,   - pt will send me a [facility] provider, wants to see  a woman    - s/p pelvic floor therapy in the past  4. Hepatic lesion  Assessment & Plan:  Found incidentally on US for hernia rule out   - lfts wnl and hep lab negative   - radiology recommended mri w and w/out contrast.   - discussed w patient to just go get it done ,     - may contact me with female sports med PT referral name     No follow-ups on file.  No orders of the defined types were placed in this encounter.     Subjective    Patient: Ely Bear is a 39 y.o. female     HPI: pins and needle feeling down right arm for ~3 months. Pain starts uptop and then dull ache and now no pain but pins and needed. Occurred when she was lifting weight over her head.       Review of Systems   Objective     Vitals:    05/27/25 0932   BP: 112/68   BP Site: Left Upper Arm   Patient Position: Sitting   BP Cuff Size: Medium Adult   Pulse: 77   SpO2: 100%   Weight: 57.2 kg (126 lb)   Height: 1.651 m (5' 5\")     Physical Exam  Allergies   Allergen Reactions    Cephalosporins Hives and Other (See Comments)    Sulfa Antibiotics Hives       Current Outpatient Medications:     albuterol sulfate HFA (PROVENTIL;VENTOLIN;PROAIR) 108 (90 Base) MCG/ACT inhaler, Inhale 2 puffs into the lungs every 4 hours as needed, Disp: , Rfl:     beclomethasone (QVAR

## 2025-05-27 NOTE — ASSESSMENT & PLAN NOTE
Found incidentally on US for hernia rule out   - lfts wnl and hep lab negative   - radiology recommended mri w and w/out contrast.   - discussed w patient to just go get it done ,

## 2025-05-29 DIAGNOSIS — N39.3 STRESS INCONTINENCE, FEMALE: Primary | ICD-10-CM

## 2025-05-29 DIAGNOSIS — N39.492 POSTURAL URINARY INCONTINENCE: ICD-10-CM

## 2025-06-04 ENCOUNTER — HOSPITAL ENCOUNTER (OUTPATIENT)
Dept: PHYSICAL THERAPY | Age: 40
Setting detail: THERAPIES SERIES
Discharge: HOME | End: 2025-06-04
Attending: STUDENT IN AN ORGANIZED HEALTH CARE EDUCATION/TRAINING PROGRAM
Payer: COMMERCIAL

## 2025-06-04 PROCEDURE — 97110 THERAPEUTIC EXERCISES: CPT

## 2025-06-04 PROCEDURE — 97162 PT EVAL MOD COMPLEX 30 MIN: CPT

## 2025-06-04 NOTE — PLAN OF CARE
Physical Therapy Evaluation/Plan of Care   [facility] [place] REHAB - PT  [address]  [place] [address]  Dept: [phone]  Dept Fax: [phone]  Loc: [phone]    Physical Therapy: Initial Evaluation    General Information    Patient: Ely Bear (39 y.o.     female)   Examination Date: 2025   :  1985 ;    Confirmed: Yes MRN: [id number]  CSN: [id number]   Insurance: Payor: [employer] / Plan: [facility] SHARED SERVICES / Product Type: *No Product type* /   Insurance ID: [id number] - (Commercial)    Secondary Insurance (if applicable):     Referring Physician: Sosa Fry MD       Visits to Date/Visits Approved:     No Show/Cancelled Appts: 0  0     Medical Diagnosis: Right arm numbness [R20.0]        Treatment Diagnosis: R UE tingling, decreased postural awareness, decreased R>L shoulder AROM, decreased cervical AROM, decreased thoracic AROM,  R>L periscapular strength, decreased deep neck flexor endurance/strength, (+) R and L Spurling's for R UE tingling, and significant soft tissue restrictions R>L cervical spine      SUBJECTIVE:     Onset date: Early 2025      Subjective/ Mechanism of Injury: Pt reports she was overhead pressing and felt a \"tweak\" in her neck. Pt reports the week preceding that she was under a lot of stress and tension and felt a lot of tightness in her neck. Pt reports at time of onset pain was sharp in R side of neck. Within ~1 month that pain had resolved though she had a dull ache into R bicep at rest or with jostling movements. After the second month that pain resolved though now has tingling into R anterior shoulder bicep to thumb. Pt has been doing some stretching including thoracic and cervical mobility 2-3x per week. Pt denies having any imaging or other treatment.      Precautions/Contraindications/Restrictions: none           Hand Dominance: Right    Dizziness: no  Double Vision: no  Changes in

## 2025-06-10 ENCOUNTER — HOSPITAL ENCOUNTER (OUTPATIENT)
Dept: PHYSICAL THERAPY | Age: 40
Setting detail: THERAPIES SERIES
Discharge: HOME | End: 2025-06-10
Attending: STUDENT IN AN ORGANIZED HEALTH CARE EDUCATION/TRAINING PROGRAM
Payer: COMMERCIAL

## 2025-06-10 PROCEDURE — 97110 THERAPEUTIC EXERCISES: CPT

## 2025-06-10 PROCEDURE — 97140 MANUAL THERAPY 1/> REGIONS: CPT

## 2025-06-10 NOTE — PROGRESS NOTES
[address] [place] [address]  Phone: [phone]      Date: 6/10/2025  Patient: Ely Bear  : 1985   Confirmed: Yes  MRN: [id number]  Referring Provider: Sosa Fry MD    Medical Diagnosis: Right arm numbness [R20.0]       Treatment Diagnosis: R UE tingling, decreased postural awareness, decreased R>L shoulder AROM, decreased cervical AROM, decreased thoracic AROM,  R>L periscapular strength, decreased deep neck flexor endurance/strength, (+) R and L Spurling's for R UE tingling, and significant soft tissue restrictions R>L cervical spine    Visit Information:  Insurance: Payor: [employer] / Plan: [facility] SHARED SERVICES / Product Type: *No Product type* /   PT Visit Information  Total # of Visits Approved: 60  Total # of Visits to Date: 2  No Show: 0  Canceled Appointment: 0  Progress Note Counter: -    Subjective Information:  Subjective: Pt reports she is having \"stronger\" tingling into R UE as well as some pain into B sides of neck and a lot of \"tightness\" in anterior neck.  HEP Compliance:  [x] Good [] Fair [] Poor [] Reports not doing due to:    Pain Screening  Patient Currently in Pain: Denies    Treatment:  Exercises:  Exercises  Exercise 1: chin nods 5\"x10 (towel under neck)- hands on SCM to avoid over activation  Exercise 2: platysma stretch*  Exercise 3: UT stretch 30\"x3 B  Exercise 4: B LS stretch 30\"x3  Exercise 5: wall slides with lift off*  Exercise 6: thoracic mobility*  Exercise 7: periscap strength*  Exercise 8: corner stretch*  Exercise 9: SCM stretch 30\"x3 B  Exercise 10: scalene stretch*  Exercise 13: HEP: UT stretch, chin nods, cervical retract     Manual:   Manual Therapy  Joint Mobilization: C5-T1 PA mobs grade III  Manual Traction: cervical distraction 30\"x5  Soft Tissue Mobilizaton: MFD with dynamic cupping B UT's folllowed by static cups x2; STM/TPR R>L UT, LS, cervical parapsinals, scalenes, SCM; total manual x29

## 2025-06-13 ENCOUNTER — HOSPITAL ENCOUNTER (OUTPATIENT)
Dept: PHYSICAL THERAPY | Age: 40
Setting detail: THERAPIES SERIES
Discharge: HOME | End: 2025-06-13
Attending: STUDENT IN AN ORGANIZED HEALTH CARE EDUCATION/TRAINING PROGRAM
Payer: COMMERCIAL

## 2025-06-13 PROCEDURE — 97110 THERAPEUTIC EXERCISES: CPT

## 2025-06-13 ASSESSMENT — PAIN SCALES - GENERAL: PAINLEVEL_OUTOF10: 3

## 2025-06-13 ASSESSMENT — PAIN DESCRIPTION - LOCATION: LOCATION: NECK

## 2025-06-13 ASSESSMENT — PAIN DESCRIPTION - DESCRIPTORS: DESCRIPTORS: TINGLING

## 2025-06-13 ASSESSMENT — PAIN DESCRIPTION - ORIENTATION: ORIENTATION: RIGHT

## 2025-06-13 NOTE — PROGRESS NOTES
tolerance In progress   LTG 6 The pt will be indep/compliant with HEP to self manage indep upon D/C In progress          Plan:  Frequency/Duration:  Plan  Plan Frequency: 1-2 (start 2x)  Plan weeks: 4-6  Specific Instructions for Next Treatment: provide corner stretches next visit  Current Treatment Recommendations: Strengthening, ROM, Neuromuscular re-education, Manual, Pain management, Home exercise program, Patient/Caregiver education & training, Modalities, Positioning, Therapeutic activities, Dry needling, Group Therapy  Modalities: Heat/Cold, Mechanical Traction, Ultrasound, E-stim - unattended  Additional Comments: IDN not covered by insurance  Pt to continue current HEP.  See objective section for any therapeutic exercise changes, additions or modifications this date.    Therapy Time:      PT Individual Minutes  Time In: 0844  Time Out: 0922  Minutes: 38  Timed Code Treatment Minutes: 38 Minutes  Procedure Minutes:0  Timed Activity Minutes Units   Ther Ex 38 3   Electronically signed by Chinmay Simeon PTA on 6/13/25 at 9:32 AM EDT

## 2025-06-17 ENCOUNTER — HOSPITAL ENCOUNTER (OUTPATIENT)
Dept: PHYSICAL THERAPY | Age: 40
Setting detail: THERAPIES SERIES
Discharge: HOME | End: 2025-06-17
Attending: STUDENT IN AN ORGANIZED HEALTH CARE EDUCATION/TRAINING PROGRAM
Payer: COMMERCIAL

## 2025-06-17 PROCEDURE — 97140 MANUAL THERAPY 1/> REGIONS: CPT

## 2025-06-17 PROCEDURE — 97110 THERAPEUTIC EXERCISES: CPT

## 2025-06-17 ASSESSMENT — PAIN SCALES - GENERAL: PAINLEVEL_OUTOF10: 3

## 2025-06-17 ASSESSMENT — PAIN DESCRIPTION - DESCRIPTORS: DESCRIPTORS: TIGHTNESS;TINGLING

## 2025-06-17 ASSESSMENT — PAIN DESCRIPTION - ORIENTATION: ORIENTATION: RIGHT;LEFT

## 2025-06-17 ASSESSMENT — PAIN DESCRIPTION - LOCATION: LOCATION: NECK

## 2025-06-17 NOTE — PROGRESS NOTES
[address] [place] [address]  Phone: [phone]      Date: 2025  Patient: Ely Bear  : 1985   Confirmed: Yes  MRN: [id number]  Referring Provider: Sosa Fry MD    Medical Diagnosis: Right arm numbness [R20.0]       Treatment Diagnosis: R UE tingling, decreased postural awareness, decreased R>L shoulder AROM, decreased cervical AROM, decreased thoracic AROM,  R>L periscapular strength, decreased deep neck flexor endurance/strength, (+) R and L Spurling's for R UE tingling, and significant soft tissue restrictions R>L cervical spine    Visit Information:  Insurance: Payor: [employer] / Plan: [facility] SHARED SERVICES / Product Type: *No Product type* /   PT Visit Information  Total # of Visits Approved: 60  Total # of Visits to Date: 4  No Show: 0  Canceled Appointment: 0  Progress Note Counter:     Subjective Information:  Subjective: Pt reports her R shoulder feels looser and has noticed a significant amount of R shoulder mobility. Pt reports she has had a lot less tingling than normal but did take the week off from weight lifting. Pt does cont to have a lot of tension in her neck.  HEP Compliance:  [x] Good [] Fair [] Poor [] Reports not doing due to:    Pain Screening  Patient Currently in Pain: Yes  Pain Level: 3  Pain Location: Neck  Pain Orientation: Right, Left  Pain Descriptors: Tightness, Tingling    Treatment:  Exercises:  Exercises  Exercise 2: platysma stretch supine with use of bed support (therapist lowered/elevated) 20-30\"x3; attempted sitting with increased tingling  Exercise 4: open book stretch 5\"x10- VC'ss to decrease umbar compensations with good carryover  Exercise 8: corner stretch (field goal, arms down) 30\"x3\" ea  Exercise 13: HEP: cont current + corner stretches (verbally provided)     Manual:   Manual Therapy  Soft Tissue Mobilizaton: STM/TPR R>L UT, LS, cervical parapsinals, scalenes, SCM; total manual x23

## 2025-06-18 NOTE — PROGRESS NOTES
Therapy                            Cancellation/No-show Note      Date: 2025  Patient: Ely Bear (39 y.o. female)  : 1985  MRN:  [id number]  Referring Physician: Sosa Fry MD    Medical Diagnosis: Right arm numbness [R20.0]      Visit Information:  Visits to Date 4   No Show/Cancelled Appts: 0 / 1      For today's appointment patient:  [x]  Cancelled  []  Rescheduled appointment  []  No-show   []  Called pt to remind of next appointment     Reason given by patient:  [x]  Patient ill  []  Conflicting appointment  []  No transportation    []  Conflict with work  []  No reason given  []  Other:      [x] Pt has future appointments scheduled, no follow up needed  [] Pt requests to be on hold.    Reason:   If > 2 weeks please discuss with therapist.  [] Therapist to call pt for follow up  [] [facility] to call to re-schedule      Comments:       Signature: Electronically signed by Zita Rosa PT on 25 at 1:03 PM EDT

## 2025-06-19 ENCOUNTER — HOSPITAL ENCOUNTER (OUTPATIENT)
Dept: PHYSICAL THERAPY | Age: 40
Setting detail: THERAPIES SERIES
Discharge: HOME | End: 2025-06-19
Attending: STUDENT IN AN ORGANIZED HEALTH CARE EDUCATION/TRAINING PROGRAM
Payer: COMMERCIAL

## 2025-06-24 ENCOUNTER — HOSPITAL ENCOUNTER (OUTPATIENT)
Dept: PHYSICAL THERAPY | Age: 40
Setting detail: THERAPIES SERIES
Discharge: HOME | End: 2025-06-24
Attending: STUDENT IN AN ORGANIZED HEALTH CARE EDUCATION/TRAINING PROGRAM
Payer: COMMERCIAL

## 2025-06-24 NOTE — PROGRESS NOTES
Therapy                            Cancellation/No-show Note      Date: 2025  Patient: Ely Bear (39 y.o. female)  : 1985  MRN:  [id number]  Referring Physician: Sosa Fry MD    Medical Diagnosis: Right arm numbness [R20.0]      Visit Information:  Visits to Date 4   No Show/Cancelled Appts: 0 / 2      For today's appointment patient:  [x]  Cancelled  []  Rescheduled appointment  []  No-show   []  Called pt to remind of next appointment     Reason given by patient:  [x]  Patient ill  []  Conflicting appointment  []  No transportation    []  Conflict with work  []  No reason given  []  Other:      [x] Pt has future appointments scheduled, no follow up needed  [] Pt requests to be on hold.    Reason:   If > 2 weeks please discuss with therapist.  [] Therapist to call pt for follow up  [] [place] to call to re-schedule      Comments:       Signature: Electronically signed by Zita Rosa PT on 25 at 7:57 AM EDT

## 2025-06-25 ENCOUNTER — OFFICE VISIT (OUTPATIENT)
Dept: PRIMARY CARE CLINIC | Age: 40
End: 2025-06-25
Payer: COMMERCIAL

## 2025-06-25 VITALS
HEIGHT: 65 IN | OXYGEN SATURATION: 99 % | BODY MASS INDEX: 20.53 KG/M2 | HEART RATE: 96 BPM | SYSTOLIC BLOOD PRESSURE: 118 MMHG | WEIGHT: 123.2 LBS | DIASTOLIC BLOOD PRESSURE: 82 MMHG

## 2025-06-25 DIAGNOSIS — J45.21 MILD INTERMITTENT ASTHMA WITH ACUTE EXACERBATION: Primary | ICD-10-CM

## 2025-06-25 DIAGNOSIS — J06.9 URI WITH COUGH AND CONGESTION: ICD-10-CM

## 2025-06-25 PROCEDURE — G8427 DOCREV CUR MEDS BY ELIG CLIN: HCPCS | Performed by: STUDENT IN AN ORGANIZED HEALTH CARE EDUCATION/TRAINING PROGRAM

## 2025-06-25 PROCEDURE — 1036F TOBACCO NON-USER: CPT | Performed by: STUDENT IN AN ORGANIZED HEALTH CARE EDUCATION/TRAINING PROGRAM

## 2025-06-25 PROCEDURE — 99214 OFFICE O/P EST MOD 30 MIN: CPT | Performed by: STUDENT IN AN ORGANIZED HEALTH CARE EDUCATION/TRAINING PROGRAM

## 2025-06-25 PROCEDURE — G8420 CALC BMI NORM PARAMETERS: HCPCS | Performed by: STUDENT IN AN ORGANIZED HEALTH CARE EDUCATION/TRAINING PROGRAM

## 2025-06-25 RX ORDER — BENZONATATE 100 MG/1
100 CAPSULE ORAL EVERY 8 HOURS
Qty: 30 CAPSULE | Refills: 0 | Status: SHIPPED | OUTPATIENT
Start: 2025-06-25 | End: 2025-07-05

## 2025-06-25 RX ORDER — ALBUTEROL SULFATE 90 UG/1
2 INHALANT RESPIRATORY (INHALATION) EVERY 4 HOURS PRN
Qty: 3 EACH | Refills: 0 | Status: SHIPPED | OUTPATIENT
Start: 2025-06-25 | End: 2026-06-25

## 2025-06-25 RX ORDER — MOMETASONE FUROATE 100 UG/1
2 AEROSOL RESPIRATORY (INHALATION) 2 TIMES DAILY
Qty: 3 EACH | Refills: 0 | Status: SHIPPED | OUTPATIENT
Start: 2025-06-25

## 2025-06-25 RX ORDER — AZITHROMYCIN 500 MG/1
500 TABLET, FILM COATED ORAL DAILY
Qty: 3 TABLET | Refills: 0 | Status: SHIPPED | OUTPATIENT
Start: 2025-06-25 | End: 2025-06-28

## 2025-06-26 ENCOUNTER — HOSPITAL ENCOUNTER (OUTPATIENT)
Dept: PHYSICAL THERAPY | Age: 40
Setting detail: THERAPIES SERIES
Discharge: HOME | End: 2025-06-26
Attending: STUDENT IN AN ORGANIZED HEALTH CARE EDUCATION/TRAINING PROGRAM
Payer: COMMERCIAL

## 2025-06-26 PROCEDURE — 97140 MANUAL THERAPY 1/> REGIONS: CPT

## 2025-06-26 PROCEDURE — 97110 THERAPEUTIC EXERCISES: CPT

## 2025-06-26 ASSESSMENT — PAIN DESCRIPTION - ORIENTATION: ORIENTATION: LEFT;RIGHT

## 2025-06-26 ASSESSMENT — PAIN SCALES - GENERAL: PAINLEVEL_OUTOF10: 3

## 2025-06-26 ASSESSMENT — PAIN DESCRIPTION - LOCATION: LOCATION: SHOULDER;NECK

## 2025-06-26 NOTE — PROGRESS NOTES
[place] [id number] [facility] [place] [address]  Phone: [phone]      Date: 2025  Patient: Ely Bear  : 1985   Confirmed: Yes  MRN: [id number]  Referring Provider: Sosa Fry MD    Medical Diagnosis: Right arm numbness [R20.0]       Treatment Diagnosis: R UE tingling, decreased postural awareness, decreased R>L shoulder AROM, decreased cervical AROM, decreased thoracic AROM,  R>L periscapular strength, decreased deep neck flexor endurance/strength, (+) R and L Spurling's for R UE tingling, and significant soft tissue restrictions R>L cervical spine    Visit Information:  Insurance: Payor: [employer] / Plan: [facility] SHARED SERVICES / Product Type: *No Product type* /   PT Visit Information  Total # of Visits Approved: 60  Total # of Visits to Date: 5  No Show: 0  Canceled Appointment: 2  Progress Note Counter:     Subjective Information:  Subjective: Pt reports decrease in frequency and intensity of N/T into R UE. Reports  ongoing pain in B neck and into posterior shoulder regions. Pt also has been not working out and off work the last week d/t illness. Pt also reports decreased tightness in anterior neck musculature.  HEP Compliance:  [x] Good [] Fair [] Poor [] Reports not doing due to:    Pain Screening  Patient Currently in Pain: Yes  Pain Level: 3  Pain Location: Shoulder, Neck  Pain Orientation: Left, Right    Treatment:  Exercises:  Exercises  Exercise 5: quadruped thoracic/cervical rotation with reach 5\"x10  Exercise 6: thoracic mobility x 10 x 5\" magy (rotation at wall)  Exercise 7: cervical retract at wall 5\"x10- VC/TC's for posture  Exercise 8: corner stretch (field goal, arms down) 30\"x3\" ea  Exercise 9: chin nods 5\"x10 with towel at wall  Exercise 13: HEP: cont current + verbally progressed chin nods and retract to standing vs supine     Manual:   Manual Therapy  Soft Tissue Mobilizaton: STM/TPR R>L UT, LS, cervical parapsinals, scalenes,

## 2025-07-01 ENCOUNTER — HOSPITAL ENCOUNTER (OUTPATIENT)
Dept: PHYSICAL THERAPY | Age: 40
Setting detail: THERAPIES SERIES
Discharge: HOME | End: 2025-07-01
Attending: STUDENT IN AN ORGANIZED HEALTH CARE EDUCATION/TRAINING PROGRAM
Payer: COMMERCIAL

## 2025-07-01 NOTE — PROGRESS NOTES
Therapy                            Cancellation/No-show Note      Date: 2025  Patient: Ely Bear (39 y.o. female)  : 1985  MRN:  [id number]  Referring Physician: Sosa Fry MD    Medical Diagnosis: Right arm numbness [R20.0]      Visit Information:  Visits to Date 5   No Show/Cancelled Appts: 0 / 3      For today's appointment patient:  [x]  Cancelled  []  Rescheduled appointment  []  No-show   []  Called pt to remind of next appointment     Reason given by patient:  []  Patient ill  []  Conflicting appointment  []  No transportation    []  Conflict with work  []  No reason given  [x]  Other:  preferred provider unavailable    [x] Pt has future appointments scheduled, no follow up needed  [] Pt requests to be on hold.    Reason:   If > 2 weeks please discuss with therapist.  [] Therapist to call pt for follow up  []  to call to re-schedule      Comments:       Signature: Electronically signed by Zita Rosa PT on 25 at 11:06 AM EDT

## 2025-07-03 ENCOUNTER — HOSPITAL ENCOUNTER (OUTPATIENT)
Dept: PHYSICAL THERAPY | Age: 40
Setting detail: THERAPIES SERIES
Discharge: HOME | End: 2025-07-03
Attending: STUDENT IN AN ORGANIZED HEALTH CARE EDUCATION/TRAINING PROGRAM
Payer: COMMERCIAL

## 2025-07-03 PROCEDURE — 97140 MANUAL THERAPY 1/> REGIONS: CPT

## 2025-07-03 ASSESSMENT — PAIN SCALES - GENERAL: PAINLEVEL_OUTOF10: 3

## 2025-07-03 ASSESSMENT — PAIN DESCRIPTION - DESCRIPTORS: DESCRIPTORS: ACHING;DULL

## 2025-07-03 ASSESSMENT — PAIN DESCRIPTION - ORIENTATION: ORIENTATION: RIGHT

## 2025-07-03 ASSESSMENT — PAIN DESCRIPTION - LOCATION: LOCATION: NECK

## 2025-07-03 NOTE — PROGRESS NOTES
[place] [id number] [facility] [place] [address]  Phone: [phone]      Date: 7/3/2025  Patient: Ely Bear  : 1985   Confirmed: Yes  MRN: [id number]  Referring Provider: Sosa Fry MD    Medical Diagnosis: Right arm numbness [R20.0]       Treatment Diagnosis: R UE tingling, decreased postural awareness, decreased R>L shoulder AROM, decreased cervical AROM, decreased thoracic AROM,  R>L periscapular strength, decreased deep neck flexor endurance/strength, (+) R and L Spurling's for R UE tingling, and significant soft tissue restrictions R>L cervical spine    Visit Information:  Insurance: Payor: [employer] / Plan: [facility] SHARED SERVICES / Product Type: *No Product type* /   PT Visit Information  Total # of Visits Approved: 60  Total # of Visits to Date: 6  No Show: 0  Canceled Appointment: 3  Progress Note Counter:     Subjective Information:  Subjective: Pt reports she started training again and \"flared\" everything up. Pt reports she did start easily at 80% of her max. Pt reports she conts to have some N/T into UE intermittently primarily when working out or immediately afterwards. Pt also reports she was getting some pain into R shoulder blade. Decerased mm tension in neck. 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 Also has been having some intermittent HA's from posterior neck pain.  HEP Compliance:  [x] Good [] Fair [] Poor [] Reports not doing due to:    Pain Screening  Patient Currently in Pain: Yes  Pain Level: 3  Pain Location: Neck  Pain Orientation: Right  Pain Descriptors: Aching, Dull    Treatment:  Manual:   Manual Therapy  Joint Mobilization: C5-T1 PA mobs grade III; T5-T9 PA mobs grade III  Muscle Energy: B UT stretching 30\"x3  Manual Traction: cervical distraction 30\"x5  Soft Tissue Mobilizaton: STM/TPR B UT, LS; MFD static cupping x2 on B UT with UT stretching and x1 at B LS origin with LS stretching;

## 2025-07-08 ENCOUNTER — HOSPITAL ENCOUNTER (OUTPATIENT)
Dept: PHYSICAL THERAPY | Age: 40
Setting detail: THERAPIES SERIES
Discharge: HOME | End: 2025-07-08
Attending: STUDENT IN AN ORGANIZED HEALTH CARE EDUCATION/TRAINING PROGRAM
Payer: COMMERCIAL

## 2025-07-08 PROCEDURE — 97140 MANUAL THERAPY 1/> REGIONS: CPT

## 2025-07-08 PROCEDURE — 97110 THERAPEUTIC EXERCISES: CPT

## 2025-07-08 NOTE — PROGRESS NOTES
[place] [id number] [facility] [place] [address]  Phone: [phone]      Date: 2025  Patient: Ely Bear  : 1985   Confirmed: Yes  MRN: [id number]  Referring Provider: Sosa Fry MD    Medical Diagnosis: Right arm numbness [R20.0]       Treatment Diagnosis: R UE tingling, decreased postural awareness, decreased R>L shoulder AROM, decreased cervical AROM, decreased thoracic AROM,  R>L periscapular strength, decreased deep neck flexor endurance/strength, (+) R and L Spurling's for R UE tingling, and significant soft tissue restrictions R>L cervical spine    Visit Information:  Insurance: Payor: [employer] / Plan: [facility] SHARED SERVICES / Product Type: *No Product type* /   PT Visit Information  Total # of Visits Approved: 60  Total # of Visits to Date: 7  No Show: 0  Canceled Appointment: 3  Progress Note Counter:     Subjective Information:  Subjective: Pt reports ongoing \"tingling\" in R UE especially with reaching or forward position of her R shoulder. Pt reports decreased pain with turning head and working out (though has contd to keep things \"light\"). Pt reports feeling that therapy has been helping. Does not have pain in the middle of the night and overall reports decreased frequency of symptoms. Pt also reports contd decrased tension in neck.  HEP Compliance:  [x] Good [] Fair [] Poor [] Reports not doing due to:    Pain Screening  Patient Currently in Pain: No    Treatment:  Exercises:  Exercises  Exercise 1: Ulnar nerve glides x10  Exercise 2: Radial nerve glides x10  Exercise 3: Median nerve glides x10  Exercise 13: HEP: ulnar, median, and radial nerve glides     Manual:   Manual Therapy  Joint Mobilization: C5-T1 PA mobs grade III; T5-T9 PA mobs grade III  Muscle Energy: B UT stretching 30\"x3  Manual Traction: cervical distraction 30\"x5  Soft Tissue Mobilizaton: STM/TPR R posterior UT, LS Trigger point; MFD static cupping x1 on R LS; Total manual

## 2025-07-10 ENCOUNTER — HOSPITAL ENCOUNTER (OUTPATIENT)
Dept: PHYSICAL THERAPY | Age: 40
Setting detail: THERAPIES SERIES
Discharge: HOME | End: 2025-07-10
Attending: STUDENT IN AN ORGANIZED HEALTH CARE EDUCATION/TRAINING PROGRAM
Payer: COMMERCIAL

## 2025-07-10 PROCEDURE — 97140 MANUAL THERAPY 1/> REGIONS: CPT

## 2025-07-10 PROCEDURE — 97110 THERAPEUTIC EXERCISES: CPT

## 2025-07-10 ASSESSMENT — PAIN SCALES - GENERAL: PAINLEVEL_OUTOF10: 4

## 2025-07-10 ASSESSMENT — PAIN DESCRIPTION - LOCATION: LOCATION: NECK

## 2025-07-10 ASSESSMENT — PAIN DESCRIPTION - DESCRIPTORS: DESCRIPTORS: ACHING

## 2025-07-10 ASSESSMENT — PAIN DESCRIPTION - ORIENTATION: ORIENTATION: RIGHT

## 2025-07-10 NOTE — PROGRESS NOTES
[place] [address] [facility] [place] [address]  Phone: [phone]      Date: 7/10/2025  Patient: Ely Bear  : 1985   Confirmed: Yes  MRN: [id number]  Referring Provider: Sosa Fry MD    Medical Diagnosis: Right arm numbness [R20.0]       Treatment Diagnosis: R UE tingling, decreased postural awareness, decreased R>L shoulder AROM, decreased cervical AROM, decreased thoracic AROM,  R>L periscapular strength, decreased deep neck flexor endurance/strength, (+) R and L Spurling's for R UE tingling, and significant soft tissue restrictions R>L cervical spine    Visit Information:  Insurance: Payor: [employer] / Plan: [facility] SHARED SERVICES / Product Type: *No Product type* /   PT Visit Information  Total # of Visits Approved: 60  Total # of Visits to Date: 8  No Show: 0  Canceled Appointment: 3  Progress Note Counter:     Subjective Information:  Subjective: Pt reports feeling increased tenderness and achiness in thoracic region. Pt reports 4/10 pain in midback and into R UE currently. Pt also reports intermittently feeling like her midback is going to \"cramp.\"  HEP Compliance:  [x] Good [] Fair [] Poor [] Reports not doing due to:    Pain Screening  Patient Currently in Pain: Yes  Pain Level: 4  Pain Location: Neck  Pain Orientation: Right  Pain Descriptors: Aching    Treatment:  Exercises:  Exercises  Exercise 1: seated B SB x10- discussed to perform for HEP to tolerance if UT stretch causes increased discomfort  Exercise 2: Rows/lats*  Exercise 3: prone rows*  Exercise 4: prone scap: Lats, horizontal abd IR/ER x15 ea  Exercise 5: DNF endurance/strength raining with BP cuff @ 40 mmHG controlling within 2 mm HG 3x15  Exercise 6: prone LT L1 x10  Exercise 13: HEP: cont current decrease frequency of thoracic stretches     Manual:   Manual Therapy  Joint Mobilization: L C5-C6 SNAGs with SB x10  Muscle Energy: B UT stretching 30\"x3; total manual x14 mins

## 2025-07-15 ENCOUNTER — HOSPITAL ENCOUNTER (OUTPATIENT)
Dept: PHYSICAL THERAPY | Age: 40
Setting detail: THERAPIES SERIES
Discharge: HOME | End: 2025-07-15
Attending: STUDENT IN AN ORGANIZED HEALTH CARE EDUCATION/TRAINING PROGRAM
Payer: COMMERCIAL

## 2025-07-15 PROCEDURE — 97140 MANUAL THERAPY 1/> REGIONS: CPT

## 2025-07-15 PROCEDURE — 97112 NEUROMUSCULAR REEDUCATION: CPT

## 2025-07-15 ASSESSMENT — PAIN DESCRIPTION - ORIENTATION: ORIENTATION: RIGHT

## 2025-07-15 ASSESSMENT — PAIN DESCRIPTION - DESCRIPTORS: DESCRIPTORS: TINGLING

## 2025-07-15 ASSESSMENT — PAIN DESCRIPTION - LOCATION: LOCATION: ARM

## 2025-07-15 ASSESSMENT — PAIN SCALES - GENERAL: PAINLEVEL_OUTOF10: 3

## 2025-07-15 NOTE — PROGRESS NOTES
[place] [address] [facility] [place] [address]  Phone: [phone]      Date: 7/15/2025  Patient: Ely Bear  : 1985   Confirmed: Yes  MRN: [id number]  Referring Provider: Sosa Fry MD    Medical Diagnosis: Right arm numbness [R20.0]       Treatment Diagnosis: R UE tingling, decreased postural awareness, decreased R>L shoulder AROM, decreased cervical AROM, decreased thoracic AROM,  R>L periscapular strength, decreased deep neck flexor endurance/strength, (+) R and L Spurling's for R UE tingling, and significant soft tissue restrictions R>L cervical spine    Visit Information:  Insurance: Payor: [employer] / Plan: [facility] SHARED SERVICES / Product Type: *No Product type* /   PT Visit Information  Total # of Visits Approved: 60  Total # of Visits to Date:   No Show: 0  Canceled Appointment: 3  Progress Note Counter:     Subjective Information:  Subjective: Pt denies having any \"pain or pinching\" in neck thoguh conts to have R arm tingling. Pt reports she did have pain in neck with a \"heavy lift\" session over the weekened. Pt also reports feeling her neck tension is starting to come back though also did a lot of heavy back training last night. Pt does report her pain overall conts to be better though \"Ebs and flows.\"  HEP Compliance:  [x] Good [] Fair [] Poor [] Reports not doing due to:    Pain Screening  Patient Currently in Pain: Yes  Pain Level: 3  Pain Location: Arm  Pain Orientation: Right  Pain Descriptors: Tingling    Treatment:  Exercises:  Exercises  Exercise 2: Rows/lats*  Exercise 3: prone rows x15  Exercise 4: prone scap: Lats, horizontal abd IR/ER x15 ea  Exercise 5: DNF endurance/strength training with BP cuff @ 40 mmHG controlling within 2 mm HG 3x10  Exercise 6: prone LT L1 x10  Exercise 7: median and radial nerve glides x15  Exercise 13: HEP: cont current     Manual:   Manual Therapy  Muscle Energy: B UT stretching 30\"x3  Soft Tissue

## 2025-07-16 ENCOUNTER — HOSPITAL ENCOUNTER (OUTPATIENT)
Dept: PHYSICAL THERAPY | Age: 40
Setting detail: THERAPIES SERIES
Discharge: HOME | End: 2025-07-16
Attending: STUDENT IN AN ORGANIZED HEALTH CARE EDUCATION/TRAINING PROGRAM
Payer: COMMERCIAL

## 2025-07-16 PROCEDURE — 97140 MANUAL THERAPY 1/> REGIONS: CPT

## 2025-07-16 PROCEDURE — 97112 NEUROMUSCULAR REEDUCATION: CPT

## 2025-07-16 ASSESSMENT — PAIN DESCRIPTION - DESCRIPTORS: DESCRIPTORS: TINGLING

## 2025-07-16 ASSESSMENT — PAIN DESCRIPTION - ORIENTATION: ORIENTATION: RIGHT

## 2025-07-16 ASSESSMENT — PAIN SCALES - GENERAL: PAINLEVEL_OUTOF10: 3

## 2025-07-16 ASSESSMENT — PAIN DESCRIPTION - LOCATION: LOCATION: ARM

## 2025-07-16 NOTE — PROGRESS NOTES
[place] [id number] [facility] [place] [address]  Phone: [phone]      Date: 2025  Patient: Ely Bear  : 1985   Confirmed: Yes  MRN: [id number]  Referring Provider: Sosa Fry MD    Medical Diagnosis: Right arm numbness [R20.0]       Treatment Diagnosis: R UE tingling, decreased postural awareness, decreased R>L shoulder AROM, decreased cervical AROM, decreased thoracic AROM,  R>L periscapular strength, decreased deep neck flexor endurance/strength, (+) R and L Spurling's for R UE tingling, and significant soft tissue restrictions R>L cervical spine    Visit Information:  Insurance: Payor: [employer] / Plan: [facility] SHARED SERVICES / Product Type: *No Product type* /   PT Visit Information  Total # of Visits Approved: 60  Total # of Visits to Date: 10  No Show: 0  Canceled Appointment: 3  Progress Note Counter: 10/12    Subjective Information:  Subjective: Pt reports significantly less tingling into R UE as compared to yesterday. Was able to workout yesterday without issues.  HEP Compliance:  [x] Good [] Fair [] Poor [] Reports not doing due to:    Pain Screening  Patient Currently in Pain: Yes  Pain Level: 3  Pain Location: Arm  Pain Orientation: Right  Pain Descriptors: Tingling    Treatment:  Exercises:  Exercises  Exercise 2: Rows/lats*  Exercise 3: prone rows x15  Exercise 4: prone scap: Lats, horizontal abd IR/ER x15 ea  Exercise 5: DNF endurance/strength training with BP cuff @ 40 mmHG controlling within 2 mm HG 3x10  Exercise 6: prone LT L1 x15  Exercise 7: Apollo lat pull down wide and supinated - Reviewed various weights  Exercise 8: Apollo seated rows- Reviewed various weights and neck position  Exercise 9: Bent over row 25# reviewed technique with VC/TC's for improved scp retract  Exercise 13: HEP: cont current     Manual:   Manual Therapy  Muscle Energy: B UT stretching 30\"x3  Manual Traction: cervical distraction 30\"x3  Soft Tissue

## 2025-07-24 ENCOUNTER — APPOINTMENT (OUTPATIENT)
Dept: OBSTETRICS AND GYNECOLOGY | Facility: CLINIC | Age: 40
End: 2025-07-24
Payer: COMMERCIAL

## 2025-07-29 ENCOUNTER — HOSPITAL ENCOUNTER (OUTPATIENT)
Dept: PHYSICAL THERAPY | Age: 40
Setting detail: THERAPIES SERIES
Discharge: HOME | End: 2025-07-29
Attending: STUDENT IN AN ORGANIZED HEALTH CARE EDUCATION/TRAINING PROGRAM
Payer: COMMERCIAL

## 2025-07-29 PROCEDURE — 97110 THERAPEUTIC EXERCISES: CPT

## 2025-07-29 PROCEDURE — 97140 MANUAL THERAPY 1/> REGIONS: CPT

## 2025-07-29 ASSESSMENT — PAIN DESCRIPTION - DESCRIPTORS: DESCRIPTORS: TINGLING

## 2025-07-29 ASSESSMENT — PAIN SCALES - GENERAL: PAINLEVEL_OUTOF10: 2

## 2025-07-29 ASSESSMENT — PAIN DESCRIPTION - ORIENTATION: ORIENTATION: RIGHT

## 2025-07-29 ASSESSMENT — PAIN DESCRIPTION - LOCATION: LOCATION: ARM

## 2025-07-29 NOTE — PROGRESS NOTES
[place] [id number] [facility] [place] [address]  Phone: [phone]      Date: 2025  Patient: Ely Bear  : 1985   Confirmed: Yes  MRN: [id number]  Referring Provider: Sosa Fry MD    Medical Diagnosis: Right arm numbness [R20.0]       Treatment Diagnosis: R UE tingling, decreased postural awareness, decreased R>L shoulder AROM, decreased cervical AROM, decreased thoracic AROM,  R>L periscapular strength, decreased deep neck flexor endurance/strength, (+) R and L Spurling's for R UE tingling, and significant soft tissue restrictions R>L cervical spine    Visit Information:  Insurance: Payor: [employer] / Plan: [facility] SHARED SERVICES / Product Type: *No Product type* /   PT Visit Information  Total # of Visits Approved: 60  Total # of Visits to Date:   No Show: 0  Canceled Appointment: 3  Progress Note Counter:     Subjective Information:  Subjective: Pt reports she conts to have variable pain though feels she is improving. Pt rpeots contd pain with powerlifting activities such as deadlifting though symptoms resolve quickly and are not as bad. Pt also conts to have intermittent \"zings\" of pain with reaching awkwardly. Pt reports feeling 75-80% functional at this time.  HEP Compliance:  [x] Good [] Fair [] Poor [] Reports not doing due to:    Pain Screening  Patient Currently in Pain: Yes  Pain Level: 2 (2-3/10)  Pain Location: Arm  Pain Orientation: Right  Pain Descriptors: Tingling    Treatment:  Exercises:  Exercises  Exercise 1: quadruped cervical retraction 3\"x15  Exercise 2: standing chin tuck at wall with towel 3\"x15, cevrical retract 3\"x15  Exercise 4: prone scap: Lats, horizontal abd IR/ER x15 ea 1# weight  Exercise 5: DNF endurance/strength training with BP cuff @ 40 mmHG controlling within 2 mm HG 3x10  Exercise 6: prone LT x10  Exercise 13: HEP: cont current- discussed increasing cervical ex's to 3 sets of 15 vs 10 for further endurance

## 2025-07-31 ENCOUNTER — HOSPITAL ENCOUNTER (OUTPATIENT)
Dept: PHYSICAL THERAPY | Age: 40
Setting detail: THERAPIES SERIES
Discharge: HOME | End: 2025-07-31
Attending: STUDENT IN AN ORGANIZED HEALTH CARE EDUCATION/TRAINING PROGRAM
Payer: COMMERCIAL

## 2025-07-31 PROCEDURE — 97140 MANUAL THERAPY 1/> REGIONS: CPT

## 2025-07-31 PROCEDURE — 97110 THERAPEUTIC EXERCISES: CPT

## 2025-07-31 ASSESSMENT — PAIN SCALES - GENERAL: PAINLEVEL_OUTOF10: 4

## 2025-07-31 ASSESSMENT — PAIN DESCRIPTION - DESCRIPTORS: DESCRIPTORS: PINS AND NEEDLES;TINGLING

## 2025-07-31 ASSESSMENT — PAIN DESCRIPTION - LOCATION: LOCATION: ARM;HAND

## 2025-07-31 ASSESSMENT — PAIN DESCRIPTION - ORIENTATION: ORIENTATION: RIGHT

## 2025-07-31 NOTE — PROGRESS NOTES
[place] [id number] [facility] [place] [address]  Phone: [phone]    [] Certification  [] Recertification []  Plan of Care  [x] Progress Note [] Discharge      Referring Provider: Sosa Fry MD     From:  Zita Rosa, PT, DPT  Patient: Ely Bear (39 y.o. female) : 1985 Date: 2025  Medical Diagnosis: Right arm numbness [R20.0]       Treatment Diagnosis: R UE tingling, decreased postural awareness, decreased R>L shoulder AROM, decreased cervical AROM, decreased thoracic AROM,  R>L periscapular strength, decreased deep neck flexor endurance/strength, (+) R and L Spurling's for R UE tingling, and significant soft tissue restrictions R>L cervical spine     Progress Report Period from: 25 to 2025    Visits to Date: 12 No Show: 0 Cancelled Appts: 3    OBJECTIVE:   Long Term Goals - Time Frame for Long Term Goals : 4-6 weeks  Goals Current/ Discharge status Status   Long Term Goal 1: The pt will demo cervical AROM WNL's all planes to improve R UE symtpoms and return to overhead lifting without symptoms LTG 1 Current Status:: 25: Cervical AROM flex 65, ext 55, SB L 40 R 40 Rot L 84 R 80; decreased intensity and frequency of UE symptoms by ~30-50%   Partially met   Long Term Goal 2: The pt will demo improved B shoulder flex/ER, R shoulder abd, and B thoracic rotation AROM WNL's in order to improve UE biomechanics and ease of lifitng tasks LTG 2 Current Status:: 25: Thoracic Rotation AROM B 75%, B shoulder AROM WNL's   Partially met   Long Term Goal 3: The pt will demo improved B periscpular strength 5/5 to improve posture with lifting tasks LTG 3 Current Status:: 25: B lats 5/5 B rhoboids, MT 4+/5 B LT 4/5; improved posture and awareness   Partially met   Long Term Goal 4: The pt will improve DNF endurance >/=5\" without SCM compensations to improve SCM overactivation and increase postural control LTG 4 Current Status:: 25: DNF endurance 0\"

## 2025-07-31 NOTE — PROGRESS NOTES
[place] [id number] [facility] [place] [address]  Phone: [phone]      Date: 2025  Patient: Ely Bear  : 1985   Confirmed: Yes  MRN: [id number]  Referring Provider: Sosa Fry MD    Medical Diagnosis: Right arm numbness [R20.0]       Treatment Diagnosis: R UE tingling, decreased postural awareness, decreased R>L shoulder AROM, decreased cervical AROM, decreased thoracic AROM,  R>L periscapular strength, decreased deep neck flexor endurance/strength, (+) R and L Spurling's for R UE tingling, and significant soft tissue restrictions R>L cervical spine    Visit Information:  Insurance: Payor: [employer] / Plan: [facility] SHARED SERVICES / Product Type: *No Product type* /   PT Visit Information  Total # of Visits Approved: 60  Total # of Visits to Date:   No Show: 0  Canceled Appointment: 3  Progress Note Counter:     Subjective Information:  Subjective: Pt reports tingling in UE got much more intense over the last day with radiation into hand without known incident. Did not over do it with workout the day prior. Reports 75-80% functional at this time. Pt also reports improvements in ability to turn her head without pain or tingling and with carrying objects such as back pack.  HEP Compliance:  [x] Good [] Fair [] Poor [] Reports not doing due to:    Pain Screening  Patient Currently in Pain: Yes  Pain Level: 4  Pain Location: Arm, Hand  Pain Orientation: Right  Pain Descriptors: Pins and needles, Tingling    Treatment:  Exercises:  Exercises  Exercise 1: quadruped cervical retraction 3\"x15  Exercise 5: DNF endurance/strength training with BP cuff @ 40 mmHG controlling within 2 mm HG 3x15  Exercise 13: HEP: quadruped cervical retract     Manual:   Manual Therapy  Muscle Energy: R UT stretching 30\"x3  Soft Tissue Mobilizaton: STM/TPR R posterior UT, LS Trigger point, R cervical parapsinals; Total manual x24 minutes     Modalities:  Moist Heat (CPT

## 2025-08-06 DIAGNOSIS — R20.0 RIGHT ARM NUMBNESS: Primary | ICD-10-CM

## 2025-08-06 DIAGNOSIS — M25.511 CHRONIC RIGHT SHOULDER PAIN: ICD-10-CM

## 2025-08-06 DIAGNOSIS — M54.2 NECK PAIN: ICD-10-CM

## 2025-08-06 DIAGNOSIS — G89.29 CHRONIC RIGHT SHOULDER PAIN: ICD-10-CM

## 2025-08-14 ENCOUNTER — APPOINTMENT (OUTPATIENT)
Dept: OBSTETRICS AND GYNECOLOGY | Facility: CLINIC | Age: 40
End: 2025-08-14
Payer: COMMERCIAL

## 2025-08-14 VITALS
WEIGHT: 122 LBS | BODY MASS INDEX: 20.3 KG/M2 | DIASTOLIC BLOOD PRESSURE: 71 MMHG | SYSTOLIC BLOOD PRESSURE: 107 MMHG | HEART RATE: 83 BPM

## 2025-08-14 DIAGNOSIS — N89.8 VAGINAL ITCHING: ICD-10-CM

## 2025-08-14 DIAGNOSIS — Z01.419 WELL WOMAN EXAM WITH ROUTINE GYNECOLOGICAL EXAM: Primary | ICD-10-CM

## 2025-08-14 DIAGNOSIS — Z11.3 SCREENING EXAMINATION FOR STI: ICD-10-CM

## 2025-08-14 PROCEDURE — 99213 OFFICE O/P EST LOW 20 MIN: CPT

## 2025-08-14 PROCEDURE — 99395 PREV VISIT EST AGE 18-39: CPT

## 2025-08-14 PROCEDURE — 87661 TRICHOMONAS VAGINALIS AMPLIF: CPT

## 2025-08-14 PROCEDURE — 87591 N.GONORRHOEAE DNA AMP PROB: CPT

## 2025-08-14 PROCEDURE — 87491 CHLMYD TRACH DNA AMP PROBE: CPT

## 2025-08-14 PROCEDURE — 87626 HPV SEP HI-RSK TYP&POOL RSLT: CPT

## 2025-08-14 PROCEDURE — 1036F TOBACCO NON-USER: CPT

## 2025-08-15 LAB
C TRACH RRNA SPEC QL NAA+PROBE: NEGATIVE
N GONORRHOEA DNA SPEC QL PROBE+SIG AMP: NEGATIVE
T VAGINALIS RRNA SPEC QL NAA+PROBE: NEGATIVE

## 2025-08-27 LAB
CYTOLOGY CMNT CVX/VAG CYTO-IMP: NORMAL
HPV HR 12 DNA GENITAL QL NAA+PROBE: POSITIVE
HPV HR GENOTYPES PNL CVX NAA+PROBE: POSITIVE
HPV16 DNA SPEC QL NAA+PROBE: NEGATIVE
HPV18 DNA SPEC QL NAA+PROBE: NEGATIVE
LAB AP HPV GENOTYPE QUESTION: YES
LAB AP HPV HR: NORMAL
LAB AP PAP ADDITIONAL TESTS: NORMAL
LABORATORY COMMENT REPORT: NORMAL
LMP START DATE: NORMAL
PATH REPORT.TOTAL CANCER: NORMAL

## 2025-09-03 ENCOUNTER — HOSPITAL ENCOUNTER (OUTPATIENT)
Dept: GENERAL RADIOLOGY | Age: 40
Discharge: HOME | End: 2025-09-05
Payer: COMMERCIAL

## 2025-09-03 ENCOUNTER — HOSPITAL ENCOUNTER (OUTPATIENT)
Dept: MRI IMAGING | Age: 40
Discharge: HOME | End: 2025-09-05
Payer: COMMERCIAL

## 2025-09-03 ENCOUNTER — OFFICE VISIT (OUTPATIENT)
Dept: GASTROENTEROLOGY | Age: 40
End: 2025-09-03
Payer: COMMERCIAL

## 2025-09-03 VITALS
SYSTOLIC BLOOD PRESSURE: 100 MMHG | BODY MASS INDEX: 21.3 KG/M2 | WEIGHT: 128 LBS | OXYGEN SATURATION: 98 % | HEART RATE: 82 BPM | DIASTOLIC BLOOD PRESSURE: 66 MMHG

## 2025-09-03 DIAGNOSIS — M54.2 NECK PAIN: ICD-10-CM

## 2025-09-03 DIAGNOSIS — M25.511 CHRONIC RIGHT SHOULDER PAIN: ICD-10-CM

## 2025-09-03 DIAGNOSIS — R20.0 RIGHT ARM NUMBNESS: ICD-10-CM

## 2025-09-03 DIAGNOSIS — G89.29 CHRONIC RIGHT SHOULDER PAIN: ICD-10-CM

## 2025-09-03 DIAGNOSIS — R93.2 ABNORMAL LIVER DIAGNOSTIC IMAGING: Primary | ICD-10-CM

## 2025-09-03 PROCEDURE — 72141 MRI NECK SPINE W/O DYE: CPT

## 2025-09-03 PROCEDURE — 72050 X-RAY EXAM NECK SPINE 4/5VWS: CPT

## 2025-09-03 PROCEDURE — 99204 OFFICE O/P NEW MOD 45 MIN: CPT | Performed by: INTERNAL MEDICINE

## 2025-09-03 PROCEDURE — 73221 MRI JOINT UPR EXTREM W/O DYE: CPT

## 2025-09-03 PROCEDURE — G8428 CUR MEDS NOT DOCUMENT: HCPCS | Performed by: INTERNAL MEDICINE

## 2025-09-03 PROCEDURE — 73030 X-RAY EXAM OF SHOULDER: CPT

## 2025-09-03 PROCEDURE — 1036F TOBACCO NON-USER: CPT | Performed by: INTERNAL MEDICINE

## 2025-09-03 PROCEDURE — G8420 CALC BMI NORM PARAMETERS: HCPCS | Performed by: INTERNAL MEDICINE

## 2025-09-03 ASSESSMENT — ENCOUNTER SYMPTOMS
COLOR CHANGE: 0
RECTAL PAIN: 0
NAUSEA: 0
ABDOMINAL DISTENTION: 0
CONSTIPATION: 0
PHOTOPHOBIA: 0
SHORTNESS OF BREATH: 0
CHEST TIGHTNESS: 0
EYE REDNESS: 0
TROUBLE SWALLOWING: 0
BLOOD IN STOOL: 0
DIARRHEA: 0
VOMITING: 0
ABDOMINAL PAIN: 0
WHEEZING: 0
EYE PAIN: 0
VOICE CHANGE: 0

## 2025-09-08 ENCOUNTER — APPOINTMENT (OUTPATIENT)
Dept: OBSTETRICS AND GYNECOLOGY | Facility: CLINIC | Age: 40
End: 2025-09-08
Payer: COMMERCIAL

## 2025-09-11 ENCOUNTER — APPOINTMENT (OUTPATIENT)
Dept: OBSTETRICS AND GYNECOLOGY | Facility: CLINIC | Age: 40
End: 2025-09-11
Payer: COMMERCIAL

## 2025-12-19 ENCOUNTER — APPOINTMENT (OUTPATIENT)
Dept: OBSTETRICS AND GYNECOLOGY | Facility: CLINIC | Age: 40
End: 2025-12-19
Payer: COMMERCIAL

## 2026-01-19 ENCOUNTER — APPOINTMENT (OUTPATIENT)
Dept: PRIMARY CARE | Facility: CLINIC | Age: 41
End: 2026-01-19
Payer: COMMERCIAL